# Patient Record
Sex: MALE | Race: BLACK OR AFRICAN AMERICAN | Employment: OTHER | URBAN - METROPOLITAN AREA
[De-identification: names, ages, dates, MRNs, and addresses within clinical notes are randomized per-mention and may not be internally consistent; named-entity substitution may affect disease eponyms.]

---

## 2021-01-02 ENCOUNTER — APPOINTMENT (OUTPATIENT)
Dept: CT IMAGING | Age: 61
DRG: 871 | End: 2021-01-02
Payer: MEDICARE

## 2021-01-02 ENCOUNTER — APPOINTMENT (OUTPATIENT)
Dept: GENERAL RADIOLOGY | Age: 61
DRG: 871 | End: 2021-01-02
Payer: MEDICARE

## 2021-01-02 ENCOUNTER — HOSPITAL ENCOUNTER (INPATIENT)
Age: 61
LOS: 1 days | Discharge: NURSING, REHAB OR LONG-TERM CARE | DRG: 871 | End: 2021-01-04
Attending: EMERGENCY MEDICINE | Admitting: INTERNAL MEDICINE
Payer: MEDICARE

## 2021-01-02 DIAGNOSIS — R94.31 T WAVE INVERSION IN EKG: ICD-10-CM
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
## 2021-01-02 LAB
A/G RATIO: 1.7 (ref 1.1–2.2)
ALBUMIN SERPL-MCNC: 4.3 G/DL (ref 3.4–5)
ALP BLD-CCNC: 48 U/L (ref 40–129)
ALT SERPL-CCNC: 22 U/L (ref 10–40)
ANION GAP SERPL CALCULATED.3IONS-SCNC: 12 MMOL/L (ref 3–16)
AST SERPL-CCNC: 26 U/L (ref 15–37)
BACTERIA: ABNORMAL /HPF
BASE EXCESS VENOUS: -0.8 MMOL/L (ref -2–3)
BASOPHILS ABSOLUTE: 0 K/UL (ref 0–0.2)
BASOPHILS RELATIVE PERCENT: 0.1 %
BILIRUB SERPL-MCNC: 0.3 MG/DL (ref 0–1)
BILIRUBIN URINE: NEGATIVE
BLOOD, URINE: ABNORMAL
BUN BLDV-MCNC: 14 MG/DL (ref 7–20)
CALCIUM SERPL-MCNC: 9.3 MG/DL (ref 8.3–10.6)
CARBOXYHEMOGLOBIN: 1.5 % (ref 0–1.5)
CHLORIDE BLD-SCNC: 106 MMOL/L (ref 99–110)
CHP ED QC CHECK: YES
CLARITY: CLEAR
CO2: 23 MMOL/L (ref 21–32)
COLOR: YELLOW
CREAT SERPL-MCNC: 1 MG/DL (ref 0.9–1.3)
EKG ATRIAL RATE: 123 BPM
EKG DIAGNOSIS: NORMAL
EKG P AXIS: 49 DEGREES
EKG P-R INTERVAL: 148 MS
EKG Q-T INTERVAL: 312 MS
EKG QRS DURATION: 82 MS
EKG QTC CALCULATION (BAZETT): 446 MS
EKG R AXIS: 72 DEGREES
EKG T AXIS: -1 DEGREES
EKG VENTRICULAR RATE: 123 BPM
EOSINOPHILS ABSOLUTE: 0 K/UL (ref 0–0.6)
EOSINOPHILS RELATIVE PERCENT: 0.1 %
EPITHELIAL CELLS, UA: ABNORMAL /HPF (ref 0–5)
GFR AFRICAN AMERICAN: >60
GFR NON-AFRICAN AMERICAN: >60
GLOBULIN: 2.5 G/DL
GLUCOSE BLD-MCNC: 106 MG/DL
GLUCOSE BLD-MCNC: 106 MG/DL (ref 70–99)
GLUCOSE BLD-MCNC: 110 MG/DL (ref 70–99)
GLUCOSE URINE: NEGATIVE MG/DL
HCO3 VENOUS: 23 MMOL/L (ref 24–28)
HCT VFR BLD CALC: 42 % (ref 40.5–52.5)
HEMOGLOBIN, VEN, REDUCED: 26.9 %
HEMOGLOBIN: 14 G/DL (ref 13.5–17.5)
KETONES, URINE: 15 MG/DL
LACTIC ACID: 2 MMOL/L (ref 0.4–2)
LACTIC ACID: 2.9 MMOL/L (ref 0.4–2)
LEUKOCYTE ESTERASE, URINE: NEGATIVE
LIPASE: 56 U/L (ref 13–60)
LYMPHOCYTES ABSOLUTE: 0.4 K/UL (ref 1–5.1)
LYMPHOCYTES RELATIVE PERCENT: 6.6 %
MCH RBC QN AUTO: 29.6 PG (ref 26–34)
MCHC RBC AUTO-ENTMCNC: 33.3 G/DL (ref 31–36)
MCV RBC AUTO: 89 FL (ref 80–100)
METHEMOGLOBIN VENOUS: 0.5 % (ref 0–1.5)
MICROSCOPIC EXAMINATION: YES
MONOCYTES ABSOLUTE: 0.4 K/UL (ref 0–1.3)
MONOCYTES RELATIVE PERCENT: 6.4 %
NEUTROPHILS ABSOLUTE: 5.9 K/UL (ref 1.7–7.7)
NEUTROPHILS RELATIVE PERCENT: 86.8 %
NITRITE, URINE: NEGATIVE
O2 SAT, VEN: 73 %
PCO2, VEN: 37.6 MMHG (ref 41–51)
PDW BLD-RTO: 15.1 % (ref 12.4–15.4)
PERFORMED ON: ABNORMAL
PH UA: 6.5 (ref 5–8)
PH VENOUS: 7.41 (ref 7.35–7.45)
PLATELET # BLD: 111 K/UL (ref 135–450)
PMV BLD AUTO: 10.3 FL (ref 5–10.5)
PO2, VEN: 40.6 MMHG (ref 25–40)
POTASSIUM REFLEX MAGNESIUM: 3.6 MMOL/L (ref 3.5–5.1)
PRO-BNP: 38 PG/ML (ref 0–124)
PROCALCITONIN: 1.08 NG/ML (ref 0–0.15)
PROTEIN UA: ABNORMAL MG/DL
RBC # BLD: 4.72 M/UL (ref 4.2–5.9)
RBC UA: ABNORMAL /HPF (ref 0–4)
SODIUM BLD-SCNC: 141 MMOL/L (ref 136–145)
SPECIFIC GRAVITY UA: 1.01 (ref 1–1.03)
TCO2 CALC VENOUS: 24 MMOL/L
TOTAL PROTEIN: 6.8 G/DL (ref 6.4–8.2)
TROPONIN: 0.02 NG/ML
TROPONIN: <0.01 NG/ML
TROPONIN: <0.01 NG/ML
URINE REFLEX TO CULTURE: ABNORMAL
URINE TYPE: ABNORMAL
UROBILINOGEN, URINE: 2 E.U./DL
VALPROIC ACID LEVEL: 66.6 UG/ML (ref 50–100)
WBC # BLD: 6.8 K/UL (ref 4–11)
WBC UA: ABNORMAL /HPF (ref 0–5)

## 2021-01-02 PROCEDURE — 6360000002 HC RX W HCPCS: Performed by: INTERNAL MEDICINE

## 2021-01-02 PROCEDURE — 96361 HYDRATE IV INFUSION ADD-ON: CPT

## 2021-01-02 PROCEDURE — 6370000000 HC RX 637 (ALT 250 FOR IP): Performed by: INTERNAL MEDICINE

## 2021-01-02 PROCEDURE — 71045 X-RAY EXAM CHEST 1 VIEW: CPT

## 2021-01-02 PROCEDURE — 73080 X-RAY EXAM OF ELBOW: CPT

## 2021-01-02 PROCEDURE — 2580000003 HC RX 258: Performed by: INTERNAL MEDICINE

## 2021-01-02 PROCEDURE — 73521 X-RAY EXAM HIPS BI 2 VIEWS: CPT

## 2021-01-02 PROCEDURE — 83880 ASSAY OF NATRIURETIC PEPTIDE: CPT

## 2021-01-02 PROCEDURE — 85025 COMPLETE CBC W/AUTO DIFF WBC: CPT

## 2021-01-02 PROCEDURE — G0378 HOSPITAL OBSERVATION PER HR: HCPCS

## 2021-01-02 PROCEDURE — 82803 BLOOD GASES ANY COMBINATION: CPT

## 2021-01-02 PROCEDURE — 80053 COMPREHEN METABOLIC PANEL: CPT

## 2021-01-02 PROCEDURE — 74177 CT ABD & PELVIS W/CONTRAST: CPT

## 2021-01-02 PROCEDURE — 80164 ASSAY DIPROPYLACETIC ACD TOT: CPT

## 2021-01-02 PROCEDURE — 84145 PROCALCITONIN (PCT): CPT

## 2021-01-02 PROCEDURE — U0003 INFECTIOUS AGENT DETECTION BY NUCLEIC ACID (DNA OR RNA); SEVERE ACUTE RESPIRATORY SYNDROME CORONAVIRUS 2 (SARS-COV-2) (CORONAVIRUS DISEASE [COVID-19]), AMPLIFIED PROBE TECHNIQUE, MAKING USE OF HIGH THROUGHPUT TECHNOLOGIES AS DESCRIBED BY CMS-2020-01-R: HCPCS

## 2021-01-02 PROCEDURE — 87449 NOS EACH ORGANISM AG IA: CPT

## 2021-01-02 PROCEDURE — 84484 ASSAY OF TROPONIN QUANT: CPT

## 2021-01-02 PROCEDURE — 73560 X-RAY EXAM OF KNEE 1 OR 2: CPT

## 2021-01-02 PROCEDURE — 81001 URINALYSIS AUTO W/SCOPE: CPT

## 2021-01-02 PROCEDURE — 6370000000 HC RX 637 (ALT 250 FOR IP): Performed by: STUDENT IN AN ORGANIZED HEALTH CARE EDUCATION/TRAINING PROGRAM

## 2021-01-02 PROCEDURE — 6360000004 HC RX CONTRAST MEDICATION: Performed by: STUDENT IN AN ORGANIZED HEALTH CARE EDUCATION/TRAINING PROGRAM

## 2021-01-02 PROCEDURE — 2580000003 HC RX 258: Performed by: STUDENT IN AN ORGANIZED HEALTH CARE EDUCATION/TRAINING PROGRAM

## 2021-01-02 PROCEDURE — 36415 COLL VENOUS BLD VENIPUNCTURE: CPT

## 2021-01-02 PROCEDURE — 72125 CT NECK SPINE W/O DYE: CPT

## 2021-01-02 PROCEDURE — 83690 ASSAY OF LIPASE: CPT

## 2021-01-02 PROCEDURE — 93005 ELECTROCARDIOGRAM TRACING: CPT | Performed by: STUDENT IN AN ORGANIZED HEALTH CARE EDUCATION/TRAINING PROGRAM

## 2021-01-02 PROCEDURE — 96360 HYDRATION IV INFUSION INIT: CPT

## 2021-01-02 PROCEDURE — 83605 ASSAY OF LACTIC ACID: CPT

## 2021-01-02 PROCEDURE — 70450 CT HEAD/BRAIN W/O DYE: CPT

## 2021-01-02 PROCEDURE — 99284 EMERGENCY DEPT VISIT MOD MDM: CPT

## 2021-01-02 RX ORDER — ONDANSETRON 2 MG/ML
4 INJECTION INTRAMUSCULAR; INTRAVENOUS EVERY 6 HOURS PRN
Status: DISCONTINUED | OUTPATIENT
Start: 2021-01-02 | End: 2021-01-05 | Stop reason: HOSPADM

## 2021-01-02 RX ORDER — ATORVASTATIN CALCIUM 20 MG/1
20 TABLET, FILM COATED ORAL DAILY
COMMUNITY
Start: 2020-06-11

## 2021-01-02 RX ORDER — BACLOFEN 5 MG/1
5 TABLET ORAL 2 TIMES DAILY PRN
COMMUNITY
Start: 2020-05-31

## 2021-01-02 RX ORDER — SODIUM CHLORIDE, SODIUM LACTATE, POTASSIUM CHLORIDE, CALCIUM CHLORIDE 600; 310; 30; 20 MG/100ML; MG/100ML; MG/100ML; MG/100ML
1000 INJECTION, SOLUTION INTRAVENOUS ONCE
Status: COMPLETED | OUTPATIENT
Start: 2021-01-02 | End: 2021-01-02

## 2021-01-02 RX ORDER — LANOLIN ALCOHOL/MO/W.PET/CERES
1000 CREAM (GRAM) TOPICAL DAILY
COMMUNITY

## 2021-01-02 RX ORDER — ERGOCALCIFEROL 1.25 MG/1
50000 CAPSULE ORAL WEEKLY
COMMUNITY

## 2021-01-02 RX ORDER — ACETAMINOPHEN 325 MG/1
650 TABLET ORAL EVERY 6 HOURS PRN
Status: DISCONTINUED | OUTPATIENT
Start: 2021-01-02 | End: 2021-01-05 | Stop reason: HOSPADM

## 2021-01-02 RX ORDER — ACETAMINOPHEN 325 MG/1
650 TABLET ORAL ONCE
Status: COMPLETED | OUTPATIENT
Start: 2021-01-02 | End: 2021-01-02

## 2021-01-02 RX ORDER — CLOZAPINE 100 MG/1
TABLET ORAL
COMMUNITY
Start: 2020-06-21 | End: 2021-01-02 | Stop reason: CLARIF

## 2021-01-02 RX ORDER — POLYETHYLENE GLYCOL 3350 17 G/17G
17 POWDER, FOR SOLUTION ORAL DAILY PRN
Status: DISCONTINUED | OUTPATIENT
Start: 2021-01-02 | End: 2021-01-05 | Stop reason: HOSPADM

## 2021-01-02 RX ORDER — CLOZAPINE 100 MG/1
100 TABLET ORAL DAILY
Status: DISCONTINUED | OUTPATIENT
Start: 2021-01-03 | End: 2021-01-05 | Stop reason: HOSPADM

## 2021-01-02 RX ORDER — CLOZAPINE 100 MG/1
100 TABLET ORAL DAILY
COMMUNITY

## 2021-01-02 RX ORDER — M-VIT,TX,IRON,MINS/CALC/FOLIC 27MG-0.4MG
1 TABLET ORAL DAILY
COMMUNITY

## 2021-01-02 RX ORDER — DIVALPROEX SODIUM 500 MG/1
500 TABLET, DELAYED RELEASE ORAL 2 TIMES DAILY
COMMUNITY
Start: 2020-06-22

## 2021-01-02 RX ORDER — PROMETHAZINE HYDROCHLORIDE 25 MG/1
12.5 TABLET ORAL EVERY 6 HOURS PRN
Status: DISCONTINUED | OUTPATIENT
Start: 2021-01-02 | End: 2021-01-05 | Stop reason: HOSPADM

## 2021-01-02 RX ORDER — AZITHROMYCIN 250 MG/1
500 TABLET, FILM COATED ORAL ONCE
Status: COMPLETED | OUTPATIENT
Start: 2021-01-02 | End: 2021-01-02

## 2021-01-02 RX ORDER — HALOPERIDOL 5 MG
5 TABLET ORAL NIGHTLY
Status: DISCONTINUED | OUTPATIENT
Start: 2021-01-02 | End: 2021-01-02 | Stop reason: CLARIF

## 2021-01-02 RX ORDER — ACETAMINOPHEN 500 MG
1000 TABLET ORAL EVERY 8 HOURS PRN
COMMUNITY

## 2021-01-02 RX ORDER — CLOZAPINE 100 MG/1
200 TABLET ORAL NIGHTLY
Status: DISCONTINUED | OUTPATIENT
Start: 2021-01-02 | End: 2021-01-05 | Stop reason: HOSPADM

## 2021-01-02 RX ORDER — CHLORAL HYDRATE 500 MG
1000 CAPSULE ORAL 2 TIMES DAILY
COMMUNITY

## 2021-01-02 RX ORDER — CLOZAPINE 100 MG/1
200 TABLET ORAL NIGHTLY
COMMUNITY

## 2021-01-02 RX ORDER — FAMOTIDINE 20 MG/1
20 TABLET, FILM COATED ORAL DAILY
COMMUNITY
Start: 2020-05-31

## 2021-01-02 RX ORDER — LANOLIN ALCOHOL/MO/W.PET/CERES
3 CREAM (GRAM) TOPICAL NIGHTLY
COMMUNITY

## 2021-01-02 RX ORDER — SODIUM CHLORIDE 0.9 % (FLUSH) 0.9 %
10 SYRINGE (ML) INJECTION EVERY 12 HOURS SCHEDULED
Status: DISCONTINUED | OUTPATIENT
Start: 2021-01-02 | End: 2021-01-05 | Stop reason: HOSPADM

## 2021-01-02 RX ORDER — IBUPROFEN 800 MG/1
800 TABLET ORAL EVERY 8 HOURS PRN
COMMUNITY

## 2021-01-02 RX ORDER — SERTRALINE HYDROCHLORIDE 100 MG/1
150 TABLET, FILM COATED ORAL DAILY
COMMUNITY
Start: 2020-06-17

## 2021-01-02 RX ORDER — BACLOFEN 5 MG/1
5 TABLET ORAL NIGHTLY
COMMUNITY

## 2021-01-02 RX ORDER — SODIUM CHLORIDE 0.9 % (FLUSH) 0.9 %
10 SYRINGE (ML) INJECTION PRN
Status: DISCONTINUED | OUTPATIENT
Start: 2021-01-02 | End: 2021-01-05 | Stop reason: HOSPADM

## 2021-01-02 RX ORDER — ACETAMINOPHEN 650 MG/1
650 SUPPOSITORY RECTAL EVERY 6 HOURS PRN
Status: DISCONTINUED | OUTPATIENT
Start: 2021-01-02 | End: 2021-01-05 | Stop reason: HOSPADM

## 2021-01-02 RX ORDER — FLUOXETINE 20 MG/1
20 TABLET, FILM COATED ORAL DAILY
Status: DISCONTINUED | OUTPATIENT
Start: 2021-01-02 | End: 2021-01-02 | Stop reason: CLARIF

## 2021-01-02 RX ORDER — PROPRANOLOL HYDROCHLORIDE 20 MG/1
20 TABLET ORAL 2 TIMES DAILY
Status: DISCONTINUED | OUTPATIENT
Start: 2021-01-02 | End: 2021-01-02 | Stop reason: CLARIF

## 2021-01-02 RX ADMIN — DEXTROSE MONOHYDRATE 1 G: 5 INJECTION INTRAVENOUS at 19:33

## 2021-01-02 RX ADMIN — SODIUM CHLORIDE, SODIUM LACTATE, POTASSIUM CHLORIDE, AND CALCIUM CHLORIDE 1000 ML: .6; .31; .03; .02 INJECTION, SOLUTION INTRAVENOUS at 13:31

## 2021-01-02 RX ADMIN — AZITHROMYCIN 500 MG: 250 TABLET, FILM COATED ORAL at 20:04

## 2021-01-02 RX ADMIN — ACETAMINOPHEN 650 MG: 325 TABLET ORAL at 13:31

## 2021-01-02 RX ADMIN — IOPAMIDOL 80 ML: 755 INJECTION, SOLUTION INTRAVENOUS at 14:04

## 2021-01-02 RX ADMIN — CLOZAPINE 200 MG: 100 TABLET ORAL at 21:59

## 2021-01-02 ASSESSMENT — PAIN DESCRIPTION - LOCATION: LOCATION: ELBOW

## 2021-01-02 ASSESSMENT — PAIN DESCRIPTION - FREQUENCY: FREQUENCY: CONTINUOUS

## 2021-01-02 ASSESSMENT — PAIN - FUNCTIONAL ASSESSMENT: PAIN_FUNCTIONAL_ASSESSMENT: ACTIVITIES ARE NOT PREVENTED

## 2021-01-02 ASSESSMENT — PAIN SCALES - GENERAL: PAINLEVEL_OUTOF10: 10

## 2021-01-02 ASSESSMENT — PAIN DESCRIPTION - PAIN TYPE: TYPE: ACUTE PAIN

## 2021-01-02 ASSESSMENT — PAIN DESCRIPTION - DESCRIPTORS: DESCRIPTORS: ACHING

## 2021-01-02 ASSESSMENT — PAIN DESCRIPTION - PROGRESSION: CLINICAL_PROGRESSION: NOT CHANGED

## 2021-01-02 NOTE — ED PROVIDER NOTES
department consisted of labs, EKG, chest x-ray, urinalysis, COVID-19 swab, Tylenol, and IV fluid. His urinalysis is pending as well as COVID-19 swab. Lactate is elevated at 2.9, patient was given IV fluid. BNP reassuring at 38. Slight bump in troponin that will be repeated. No leukocytosis or left shift. There is a thrombocytopenia that can be seen as an acute phase reactant. Given his persistent tachycardia, and fever with moderately elevated lactate with concerns for severe sepsis, anticipate the patient will need to be admitted.     Please see resident note for reassessments and final disposition         Jeremy Banegas MD  01/02/21 0706

## 2021-01-02 NOTE — H&P
Hospital Medicine History & Physical      PCP: Rangel Aguilar MD    Date of Admission: 1/2/2021    Date of Service: Pt seen/examined on 1/2/2021 and  Placed in Observation. Chief Complaint:  fall      History Of Present Illness:  Patient is 56-year-old gentleman resident of [facility] with history of schizophrenia, depression presented to ER after he had a fall. Patient reported that on baseline he uses walker and wheelchair while today he was trying to get out of the bathroom when he tried to sit up on the bed from wheelchair he slid down and had a fall on the right side. Denies any lightheadedness, dizziness prior to fall or loss of consciousness. Patient denies any chest pain, shortness of breath, nausea, vomiting, fever, chills. He is complaining of pain under his right elbow. On arrival to ER patient was noted to have fever 101.7. Blood work significant for lactic acid of 2.9, initial troponin 0 0.02. Imaging including CT head, cervical spine  CT abdomen pelvis minimal nonspecific groundglass opacities and consolidation in the lingula and left lower lobe. EKG showed sinus tachycardia, sinus rhythm with T wave inversion in lead II, III, aVF and precordial leads. Precordial lead T wave inversions are new compared to EKG with UC last week. Patient is being admitted for new EKG changes. Past Medical History:          Diagnosis Date    B[facility] 1 disorder ([facility]     Diabetes mellitus ([facility] [facility]     Dizziness     Schizo-affective psychosis ([facility] [address]        Past Surgical History:          Procedure Laterality Date    COLONOSCOPY         Medications Prior to Admission:      Prior to Admission medications    Medication Sig Start Date End Date Taking? Authorizing Provider   haloperidol (HALDOL) 0.5 MG tablet Take 5 mg by mouth nightly. Historical Provider, MD   cloZAPine (CLOZARIL) 100 MG tablet Take 100 mg by mouth 2 times daily.     Historical Provider, MD   lithium 300 MG capsule Take 300 mg by mouth 2 times daily (with meals). Historical Provider, MD   propranolol (INDERAL) 20 MG tablet Take 20 mg by mouth 2 times daily. Historical Provider, MD   FLUoxetine (PROZAC) 20 MG tablet Take 20 mg by mouth daily. Historical Provider, MD   HYDROcodone-acetaminophen (NORCO) 5-325 MG per tablet Take 1-2 tablets by mouth every 6 hours as needed for Pain. WARNING:  May cause drowsiness. May impair ability to operate vehicles or machinery. Do not use in combination with alcohol. 7/28/14   Nahid Cornelius MD       Allergies: Other    Social History:      The patient currently lives assisted living [facility]    TOBACCO:   reports that he has never smoked. He does not have any smokeless tobacco history on file. ETOH:   reports no history of alcohol use. Family History:       Reviewed in detail and negative for DM, CAD, Cancer, CVA. Problem Relation Age of Onset    High Blood Pressure Mother     Cancer Maternal Aunt     Diabetes Maternal Aunt     Asthma Maternal Cousin     Stroke Other         MGA       REVIEW OF SYSTEMS:   Pertinent positives as noted in the HPI. All other systems reviewed and negative. PHYSICAL EXAM PERFORMED:    /74   Pulse 128   Temp 101.7 °F (38.7 °C) (Oral)   Resp 18   Ht 5' 11\" (1.803 m)   Wt 225 lb (102.1 kg)   SpO2 97%   BMI 31.38 kg/m²     General appearance:  No apparent distress, appears stated age and cooperative. HEENT:  Normal cephalic, atraumatic without obvious deformity. Pupils equal, round, and reactive to light. Extra ocular muscles intact. Conjunctivae/corneas clear. Neck: Supple, with full range of motion. No jugular venous distention. Trachea midline. Respiratory:  Normal respiratory effort. Clear to auscultation, bilaterally without Rales/Wheezes/Rhonchi. Cardiovascular:  Regular rate and rhythm with normal S1/S2 without murmurs, rubs or gallops.   Abdomen: Soft, non-tender, non-distended with normal bowel sounds. Musculoskeletal:  No clubbing, cyanosis or edema bilaterally. Full range of motion without deformity. Skin: Skin color, texture, turgor normal.  No rashes or lesions. Neurologic:  Neurovascularly intact without any focal sensory/motor deficits. Cranial nerves: II-XII intact, grossly non-focal.  Psychiatric:  Alert and oriented, thought content appropriate, normal insight  Capillary Refill: Brisk,< 3 seconds   Peripheral Pulses: +2 palpable, equal bilaterally       Labs:     Recent Labs     01/02/21  1239   WBC 6.8   HGB 14.0   HCT 42.0   *     Recent Labs     01/02/21  1239      K 3.6      CO2 23   BUN 14   CREATININE 1.0   CALCIUM 9.3     Recent Labs     01/02/21  1239   AST 26   ALT 22   BILITOT 0.3   ALKPHOS 48     No results for input(s): INR in the last 72 hours. Recent Labs     01/02/21  1239 01/02/21  1411   TROPONINI 0.02* <0.01       Urinalysis:      Lab Results   Component Value Date    NITRU Negative 01/02/2021    WBCUA 3-5 01/02/2021    BACTERIA Rare 01/02/2021    RBCUA 5-10 01/02/2021    BLOODU TRACE-INTACT 01/02/2021    SPECGRAV 1.015 01/02/2021    GLUCOSEU Negative 01/02/2021       Radiology:     CXR: I have reviewed the CXR with the following interpretation: see radio report  EKG:  I have reviewed the EKG with the following interpretation: sinus tachycardia, TWI in precordial leads. Reviewed radiology reports    ASSESSMENT:    Active Hospital Problems    Diagnosis Date Noted    Nonspecific ST-T wave electrocardiographic changes [R94.31] 01/02/2021         Assessment and plan  #EKG changes  Troponin 0 0.02> 0.01  We will get another set of troponin. Repeat EKG in morning. No prior history of cardiac disease. We will have cardiology evaluation. Will defer further cardiac work-up to them. #Mechanical fall  #Right elbow pain   Ice pack  PT/OT has been consulted. #Fever  #Lactic acidosis. Rule out COVID-19. Patient is not hypoxic  We will monitor. Check procalcitonin level. UA did not showed WBC. #schizophrenia stable  Resume home medication. DVT Prophylaxis: lovenox  Diet: No diet orders on file Cardiac diet  Code Status: Prior Full code    PT/OT Eval Status: consulted. Dispo - obs. Cardio eval.     This chart was likely completed using voice recognition technology and may contain unintended grammatical , phraseology,and/or punctuation errors       Drea Escoto MD    Thank you Trav Bernal MD for the opportunity to be involved in this patient's care. If you have any questions or concerns please feel free to contact me at [phone].

## 2021-01-02 NOTE — ED PROVIDER NOTES
[id number] RESIDENT NOTE       Date of evaluation: 1/2/2021    Chief Complaint     No chief complaint on file. History of Present Illness     Tila Olivares is a 64 y.o. male who presents for fall at care facility. Patient reports that he is try to get back into bed this morning, within the last 2 hours leading up to presentation in the ED, and slipped try to get back into bed as he could not get over further enough into the bed. He subsequently slipped onto the floor, and believes that he might of hit his head and neck on the side of the bed on the way down. He did not lose consciousness, does not take any anticoagulation, and has no numbness, tingling, weakness, headache, vision changes, blurry vision at this point time. He is primarily having pain in his C-spine neck, right elbow, right knee, right hip. Of note, he has also had decreased p.o. intake, malaise, and some dyspnea but no productive cough. He has been tested multiple times for COVID-19 with negative results, most recent of which were [facility] on 12/29. Review of Systems     Review of Systems negative except as document above in the HPI. Past Medical, Surgical, Family, and Social History     He has a past medical history of Bipolar 1 disorder ([address] Diabetes mellitus ([address] Dizziness, and Schizo-affective psychosis ([facility] [address] He has a past surgical history that includes Colonoscopy. His family history includes Asthma in his maternal cousin; Cancer in his maternal aunt; Diabetes in his maternal aunt; High Blood Pressure in his mother; Stroke in an other family member. He reports that he has never smoked. He does not have any smokeless tobacco history on file. He reports that he does not drink alcohol or use drugs.     Medications     Previous Medications    ACETAMINOPHEN (TYLENOL) 500 MG TABLET    Take 1,000 mg by mouth every 8 hours as needed for Pain    ATORVASTATIN (LIPITOR) 20 MG TABLET    Take 20 mg by mouth daily     BACLOFEN (LIORESAL) 5 MG TABLET    Take 5 mg by mouth 2 times daily as needed (muscle spasms)     BACLOFEN (LIORESAL) 5 MG TABLET    Take 5 mg by mouth nightly    CLOZAPINE (CLOZARIL) 100 MG TABLET    Take 100 mg by mouth daily    CLOZAPINE (CLOZARIL) 100 MG TABLET    Take 200 mg by mouth nightly    DIVALPROEX (DEPAKOTE) 500 MG DR TABLET    Take 500 mg by mouth 2 times daily     FAMOTIDINE (PEPCID) 20 MG TABLET    Take 20 mg by mouth daily     IBUPROFEN (ADVIL;MOTRIN) 800 MG TABLET    Take 800 mg by mouth every 8 hours as needed for Pain    MAGNESIUM HYDROXIDE (MILK OF MAGNESIA) 400 MG/5ML SUSPENSION    Take 30 mLs by mouth daily as needed for Constipation    MELATONIN 3 MG TABS TABLET    Take 3 mg by mouth nightly    MULTIPLE VITAMINS-MINERALS (THERAPEUTIC MULTIVITAMIN-MINERALS) TABLET    Take 1 tablet by mouth daily    OMEGA-3 FATTY ACIDS (FISH OIL) 1000 MG CAPS    Take 1,000 mg by mouth 2 times daily    SERTRALINE (ZOLOFT) 100 MG TABLET    Take 150 mg by mouth daily     VITAMIN B-12 (CYANOCOBALAMIN) 1000 MCG TABLET    Take 1,000 mcg by mouth daily    VITAMIN D (ERGOCALCIFEROL) 1.25 MG (62785 UT) CAPS CAPSULE    Take 50,000 Units by mouth once a week Saturdays       Allergies     He is allergic to other. Physical Exam     INITIAL VITALS: BP: 110/74, Temp: 101.7 °F (38.7 °C), Pulse: 128, Resp: 18, SpO2: 97 %   Physical Exam  Vitals signs and nursing note reviewed. Constitutional:       General: He is not in acute distress. Appearance: He is not diaphoretic. HENT:      Head: Normocephalic and atraumatic. Comments: No external signs of trauma to the occiput, although does have tenderness to palpation of the midline C-spine. No obvious ecchymoses or lacerations     Mouth/Throat:      Mouth: Mucous membranes are moist.      Pharynx: Oropharynx is clear. Eyes:      General: No scleral icterus. Right eye: No discharge. Left eye: No discharge. [facility], T wave inversions are new new in precordial leads, has new inversions in some of his inferior leads as well  Comparison: 12/29 at [address]:  CT ABDOMEN PELVIS W IV CONTRAST Additional Contrast? Radiologist Recommendation   Final Result      No acute abdominopelvic abnormality. Minimal nonspecific patchy ground glass opacities and consolidation in the lingula and left lower lobe. This may be infectious or inflammatory to include atypical/viral pneumonia related to atelectasis. CT Cervical Spine WO Contrast   Final Result      No acute bony pathology visualized. Significant degenerative changes. Evaluation of central canal stenosis or neural foraminal narrowing can be accomplished with MRI on an outpatient basis. CT Head WO Contrast   Final Result      No acute intracranial pathology        Chronic maxillary sinusitis         XR HIP BILATERAL W AP PELVIS (2 VIEWS)   Final Result   Addendum 1 of 1   [** ** ** ** ADDENDUM #1 ** ** ** **   There is a typographical error in the body report. It should state \"mild    bilateral hip OSTEOARTHROSIS\". Final      XR KNEE RIGHT (1-2 VIEWS)   Final Result      No acute bony pathology.       Mild degenerative change      XR ELBOW RIGHT (MIN 3 VIEWS)   Final Result      No acute bony pathology      XR CHEST PORTABLE   Final Result      No acute pulmonary pathology or change from the prior study                      LABS:   Results for orders placed or performed during the hospital encounter of 01/02/21   CBC Auto Differential   Result Value Ref Range    WBC 6.8 4.0 - 11.0 K/uL    RBC 4.72 4.20 - 5.90 M/uL    Hemoglobin 14.0 13.5 - 17.5 g/dL    Hematocrit 42.0 40.5 - 52.5 %    MCV 89.0 80.0 - 100.0 fL    MCH 29.6 26.0 - 34.0 pg    MCHC 33.3 31.0 - 36.0 g/dL    RDW 15.1 12.4 - 15.4 %    Platelets 777 (L) 419 - 450 K/uL    MPV 10.3 5.0 - 10.5 fL    Neutrophils % 86.8 %    Lymphocytes % Lactic Acid 2.9 (H) 0.4 - 2.0 mmol/L   Blood gas, venous (Lab)   Result Value Ref Range    pH, Mandeep 7.405 7.350 - 7.450    pCO2, Mandeep 37.6 (L) 41.0 - 51.0 mmHg    pO2, Mandeep 40.6 (H) 25.0 - 40.0 mmHg    HCO3, Venous 23.0 (L) 24.0 - 28.0 mmol/L    Base Excess, Mandeep -0.8 -2.0 - 3.0 mmol/L    O2 Sat, Mandeep 73 Not established %    Carboxyhemoglobin 1.5 0.0 - 1.5 %    MetHgb, Mandeep 0.5 0.0 - 1.5 %    TC02 (Calc), Mandeep 24 mmol/L    Hemoglobin, Mandeep, Reduced 26.90 %   Troponin   Result Value Ref Range    Troponin <0.01 <0.01 ng/mL   Lactate, plasma   Result Value Ref Range    Lactic Acid 2.0 0.4 - 2.0 mmol/L   Microscopic Urinalysis   Result Value Ref Range    WBC, UA 3-5 0 - 5 /HPF    RBC, UA 5-10 (A) 0 - 4 /HPF    Epithelial Cells, UA 2-5 0 - 5 /HPF    Bacteria, UA Rare (A) None Seen /HPF   Procalcitonin   Result Value Ref Range    Procalcitonin 1.08 (H) 0.00 - 0.15 ng/mL   Troponin   Result Value Ref Range    Troponin <0.01 <0.01 ng/mL   POC Glucose   Result Value Ref Range    Glucose 106 mg/dL    QC OK? yes    POCT Glucose   Result Value Ref Range    POC Glucose 106 (H) 70 - 99 mg/dl    Performed on ACCU-CHEK    EKG 12 Lead   Result Value Ref Range    Ventricular Rate 123 BPM    Atrial Rate 123 BPM    P-R Interval 148 ms    QRS Duration 82 ms    Q-T Interval 312 ms    QTc Calculation (Bazett) 446 ms    P Axis 49 degrees    R Axis 72 degrees    T Axis -1 degrees    Diagnosis       EKG performed in ER and to be interpreted by ER physician. Confirmed by MD, ER ([id number]),  Kaushik Ponce ([id number]) on 1/2/2021 12:09:01 PM       ED BEDSIDE ULTRASOUND:       RECENT VITALS:  BP: 116/78, Temp: 99.9 °F (37.7 °C), Pulse: 106,Resp: 20, SpO2: 99 %     Procedures          ED Course     Nursing Notes, Past Medical Hx, Past Surgical Hx, Social Hx, Allergies, and Family Hx were reviewed.     The patient was given the followingmedications:  Orders Placed This Encounter   Medications    acetaminophen (TYLENOL) tablet 650 mg    lactated ringers infusion 1,000 mL    iopamidol (ISOVUE-370) 76 % injection 80 mL    cloZAPine (CLOZARIL) tablet 100 mg    DISCONTD: FLUoxetine (PROZAC) tablet 20 mg    DISCONTD: haloperidol (HALDOL) tablet 5 mg    DISCONTD: propranolol (INDERAL) tablet 20 mg    sodium chloride flush 0.9 % injection 10 mL    sodium chloride flush 0.9 % injection 10 mL    enoxaparin (LOVENOX) injection 40 mg    OR Linked Order Group     promethazine (PHENERGAN) tablet 12.5 mg     ondansetron (ZOFRAN) injection 4 mg    polyethylene glycol (GLYCOLAX) packet 17 g    OR Linked Order Group     acetaminophen (TYLENOL) tablet 650 mg     acetaminophen (TYLENOL) suppository 650 mg    cefTRIAXone (ROCEPHIN) 1 g IVPB in 50 mL D5W minibag     Order Specific Question:   Antimicrobial Indications     Answer:   Aspiration Pneumonia    azithromycin (ZITHROMAX) tablet 500 mg     Order Specific Question:   Antimicrobial Indications     Answer:   Aspiration Pneumonia    cloZAPine (CLOZARIL) tablet 200 mg       CONSULTS:  IP CONSULT TO HOSPITALIST  IP CONSULT TO PHARMACY  IP CONSULT TO SOCIAL WORK  IP CONSULT TO [address] [place] [address] / ISAÍAS / Elsy Davy is a 64 y.o. male with past medical history as above who is presenting for fall from bed. Patient's neurological exam is intact and without sign of focal deficit at this point time, although right upper extremity examination is limited secondary to pain status post fall. He did not have any preceding symptoms including cardiac symptom such as palpitations, presyncopal symptoms, lightheadedness, chest pain, shortness of breath; additionally, he denies any bruising neurological signs that he thinks may have caused him to fall, such as dizziness, nausea, vomiting, weakness on one side or the other, sensation changes.   He is at higher risk for intracranial and C-spine injury given his midline tenderness to palpation, thus CT head and neck was performed which is negative for acute intracranial process or C-spine fracture. Additional laboratory work-up was obtained to evaluate for other potential medical etiologies for his fall, and was remarkable for a stable troponin x2, although has new signs and his EKG when compared with recent EKG concerning for progression of ischemia given the fact that he had new inversions. He had no chest pain concerning for unstable angina at this point time. Given the fact the troponin slightly elevated but stable, low suspicion for this being NSTEMI at this point in time, likely demand associated in setting of significant tachycardia on arrival.  He is also febrile; although he has had multiple Covid test that were negative in past, still suspect that he may be reexposed given his living situation, and obtain new Covid test today. Initial infectious work-up was obtained as well. This patient was also evaluated by the attending physician. All care plans werediscussed and agreed upon. X-ray imaging of the painful joints was negative for fracture or dislocation. Given the new T wave inversions, slight elevation in troponin, and necessity of additional work-up and evaluation is appropriate, patient was admitted to medicine service for potential further provoked cardiac testing in the setting of many years since his last cardiac testing, cardiovascular risk factors and findings on work-up today. Patient was provided fluids, and admitted to medicine in stable condition. Clinical Impression     1. Fall from bed, initial encounter    2. T wave inversion in EKG        Disposition     PATIENT REFERRED TO:  No follow-up provider specified.     DISCHARGE MEDICATIONS:  New Prescriptions    No medications on file       DISPOSITION Admitted 01/02/2021 03:16:37 PM       Elaine Gonsalves MD  Resident  01/02/21 [id number]

## 2021-01-03 PROBLEM — R94.31 ABNORMAL EKG: Status: ACTIVE | Noted: 2021-01-03

## 2021-01-03 LAB
ANION GAP SERPL CALCULATED.3IONS-SCNC: 12 MMOL/L (ref 3–16)
BUN BLDV-MCNC: 10 MG/DL (ref 7–20)
C-REACTIVE PROTEIN: 139.9 MG/L (ref 0–5.1)
CALCIUM SERPL-MCNC: 8.6 MG/DL (ref 8.3–10.6)
CHLORIDE BLD-SCNC: 103 MMOL/L (ref 99–110)
CO2: 23 MMOL/L (ref 21–32)
CREAT SERPL-MCNC: 0.8 MG/DL (ref 0.9–1.3)
EKG ATRIAL RATE: 123 BPM
EKG DIAGNOSIS: NORMAL
EKG P AXIS: 49 DEGREES
EKG P-R INTERVAL: 148 MS
EKG Q-T INTERVAL: 312 MS
EKG QRS DURATION: 82 MS
EKG QTC CALCULATION (BAZETT): 446 MS
EKG R AXIS: 72 DEGREES
EKG T AXIS: -1 DEGREES
EKG VENTRICULAR RATE: 123 BPM
FERRITIN: 437.4 NG/ML (ref 30–400)
GFR AFRICAN AMERICAN: >60
GFR NON-AFRICAN AMERICAN: >60
GLUCOSE BLD-MCNC: 99 MG/DL (ref 70–99)
HCT VFR BLD CALC: 40.7 % (ref 40.5–52.5)
HEMOGLOBIN: 13.6 G/DL (ref 13.5–17.5)
L. PNEUMOPHILA SEROGP 1 UR AG: NORMAL
LACTATE DEHYDROGENASE: 278 U/L (ref 100–190)
MAGNESIUM: 1.7 MG/DL (ref 1.8–2.4)
MCH RBC QN AUTO: 29.7 PG (ref 26–34)
MCHC RBC AUTO-ENTMCNC: 33.4 G/DL (ref 31–36)
MCV RBC AUTO: 89.1 FL (ref 80–100)
PDW BLD-RTO: 14.7 % (ref 12.4–15.4)
PLATELET # BLD: 106 K/UL (ref 135–450)
PMV BLD AUTO: 10.6 FL (ref 5–10.5)
POTASSIUM REFLEX MAGNESIUM: 3.3 MMOL/L (ref 3.5–5.1)
RBC # BLD: 4.57 M/UL (ref 4.2–5.9)
SARS-COV-2: DETECTED
SODIUM BLD-SCNC: 138 MMOL/L (ref 136–145)
STREP PNEUMONIAE ANTIGEN, URINE: NORMAL
WBC # BLD: 9.8 K/UL (ref 4–11)

## 2021-01-03 PROCEDURE — 82728 ASSAY OF FERRITIN: CPT

## 2021-01-03 PROCEDURE — 99204 OFFICE O/P NEW MOD 45 MIN: CPT | Performed by: INTERNAL MEDICINE

## 2021-01-03 PROCEDURE — 85027 COMPLETE CBC AUTOMATED: CPT

## 2021-01-03 PROCEDURE — 36415 COLL VENOUS BLD VENIPUNCTURE: CPT

## 2021-01-03 PROCEDURE — 96366 THER/PROPH/DIAG IV INF ADDON: CPT

## 2021-01-03 PROCEDURE — 2580000003 HC RX 258: Performed by: INTERNAL MEDICINE

## 2021-01-03 PROCEDURE — 83615 LACTATE (LD) (LDH) ENZYME: CPT

## 2021-01-03 PROCEDURE — 96367 TX/PROPH/DG ADDL SEQ IV INF: CPT

## 2021-01-03 PROCEDURE — 6360000002 HC RX W HCPCS: Performed by: INTERNAL MEDICINE

## 2021-01-03 PROCEDURE — 96372 THER/PROPH/DIAG INJ SC/IM: CPT

## 2021-01-03 PROCEDURE — 6370000000 HC RX 637 (ALT 250 FOR IP): Performed by: INTERNAL MEDICINE

## 2021-01-03 PROCEDURE — 80048 BASIC METABOLIC PNL TOTAL CA: CPT

## 2021-01-03 PROCEDURE — 96365 THER/PROPH/DIAG IV INF INIT: CPT

## 2021-01-03 PROCEDURE — 86140 C-REACTIVE PROTEIN: CPT

## 2021-01-03 PROCEDURE — 83735 ASSAY OF MAGNESIUM: CPT

## 2021-01-03 PROCEDURE — G0378 HOSPITAL OBSERVATION PER HR: HCPCS

## 2021-01-03 RX ORDER — MAGNESIUM SULFATE IN WATER 40 MG/ML
2 INJECTION, SOLUTION INTRAVENOUS ONCE
Status: COMPLETED | OUTPATIENT
Start: 2021-01-03 | End: 2021-01-03

## 2021-01-03 RX ORDER — OXYCODONE HYDROCHLORIDE 5 MG/1
5 TABLET ORAL ONCE
Status: COMPLETED | OUTPATIENT
Start: 2021-01-03 | End: 2021-01-03

## 2021-01-03 RX ORDER — BACLOFEN 10 MG/1
5 TABLET ORAL NIGHTLY
Status: DISCONTINUED | OUTPATIENT
Start: 2021-01-03 | End: 2021-01-05 | Stop reason: HOSPADM

## 2021-01-03 RX ORDER — ATORVASTATIN CALCIUM 20 MG/1
20 TABLET, FILM COATED ORAL DAILY
Status: DISCONTINUED | OUTPATIENT
Start: 2021-01-03 | End: 2021-01-05 | Stop reason: HOSPADM

## 2021-01-03 RX ORDER — DIVALPROEX SODIUM 500 MG/1
500 TABLET, DELAYED RELEASE ORAL 2 TIMES DAILY
Status: DISCONTINUED | OUTPATIENT
Start: 2021-01-03 | End: 2021-01-05 | Stop reason: HOSPADM

## 2021-01-03 RX ORDER — AZITHROMYCIN 250 MG/1
250 TABLET, FILM COATED ORAL DAILY
Status: DISCONTINUED | OUTPATIENT
Start: 2021-01-03 | End: 2021-01-03

## 2021-01-03 RX ORDER — ASPIRIN 81 MG/1
81 TABLET, CHEWABLE ORAL DAILY
Status: DISCONTINUED | OUTPATIENT
Start: 2021-01-03 | End: 2021-01-05 | Stop reason: HOSPADM

## 2021-01-03 RX ORDER — BACLOFEN 10 MG/1
5 TABLET ORAL 2 TIMES DAILY PRN
Status: DISCONTINUED | OUTPATIENT
Start: 2021-01-03 | End: 2021-01-05 | Stop reason: HOSPADM

## 2021-01-03 RX ADMIN — Medication 10 ML: at 21:25

## 2021-01-03 RX ADMIN — CLOZAPINE 200 MG: 100 TABLET ORAL at 21:19

## 2021-01-03 RX ADMIN — DIVALPROEX SODIUM 500 MG: 500 TABLET, DELAYED RELEASE ORAL at 21:19

## 2021-01-03 RX ADMIN — CLOZAPINE 100 MG: 100 TABLET ORAL at 08:07

## 2021-01-03 RX ADMIN — ACETAMINOPHEN 650 MG: 325 TABLET ORAL at 21:23

## 2021-01-03 RX ADMIN — SERTRALINE HYDROCHLORIDE 150 MG: 100 TABLET ORAL at 23:30

## 2021-01-03 RX ADMIN — ENOXAPARIN SODIUM 40 MG: 40 INJECTION SUBCUTANEOUS at 08:07

## 2021-01-03 RX ADMIN — ATORVASTATIN CALCIUM 20 MG: 20 TABLET, FILM COATED ORAL at 10:43

## 2021-01-03 RX ADMIN — MAGNESIUM SULFATE HEPTAHYDRATE 2 G: 40 INJECTION, SOLUTION INTRAVENOUS at 13:23

## 2021-01-03 RX ADMIN — DEXTROSE MONOHYDRATE 1 G: 5 INJECTION INTRAVENOUS at 17:57

## 2021-01-03 RX ADMIN — ASPIRIN 81 MG: 81 TABLET, CHEWABLE ORAL at 10:42

## 2021-01-03 RX ADMIN — BACLOFEN 5 MG: 10 TABLET ORAL at 21:19

## 2021-01-03 RX ADMIN — SERTRALINE HYDROCHLORIDE 150 MG: 100 TABLET ORAL at 10:42

## 2021-01-03 RX ADMIN — METOPROLOL TARTRATE 12.5 MG: 25 TABLET, FILM COATED ORAL at 10:43

## 2021-01-03 RX ADMIN — ACETAMINOPHEN 650 MG: 325 TABLET ORAL at 01:00

## 2021-01-03 RX ADMIN — DIVALPROEX SODIUM 500 MG: 500 TABLET, DELAYED RELEASE ORAL at 10:43

## 2021-01-03 RX ADMIN — METOPROLOL TARTRATE 12.5 MG: 25 TABLET, FILM COATED ORAL at 21:19

## 2021-01-03 RX ADMIN — POTASSIUM BICARBONATE 40 MEQ: 782 TABLET, EFFERVESCENT ORAL at 13:09

## 2021-01-03 RX ADMIN — ENOXAPARIN SODIUM 30 MG: 30 INJECTION SUBCUTANEOUS at 21:24

## 2021-01-03 RX ADMIN — OXYCODONE 5 MG: 5 TABLET ORAL at 05:38

## 2021-01-03 RX ADMIN — Medication 10 ML: at 08:05

## 2021-01-03 RX ADMIN — ACETAMINOPHEN 650 MG: 325 TABLET ORAL at 15:17

## 2021-01-03 ASSESSMENT — PAIN DESCRIPTION - PAIN TYPE
TYPE: ACUTE PAIN
TYPE: ACUTE PAIN

## 2021-01-03 ASSESSMENT — PAIN DESCRIPTION - ORIENTATION
ORIENTATION: RIGHT

## 2021-01-03 ASSESSMENT — PAIN DESCRIPTION - FREQUENCY
FREQUENCY: CONTINUOUS
FREQUENCY: INTERMITTENT

## 2021-01-03 ASSESSMENT — PAIN DESCRIPTION - PROGRESSION
CLINICAL_PROGRESSION: GRADUALLY WORSENING
CLINICAL_PROGRESSION: NOT CHANGED

## 2021-01-03 ASSESSMENT — PAIN DESCRIPTION - ONSET
ONSET: ON-GOING
ONSET: ON-GOING

## 2021-01-03 ASSESSMENT — PAIN - FUNCTIONAL ASSESSMENT
PAIN_FUNCTIONAL_ASSESSMENT: PREVENTS OR INTERFERES SOME ACTIVE ACTIVITIES AND ADLS
PAIN_FUNCTIONAL_ASSESSMENT: ACTIVITIES ARE NOT PREVENTED

## 2021-01-03 ASSESSMENT — PAIN DESCRIPTION - LOCATION: LOCATION: ELBOW

## 2021-01-03 ASSESSMENT — PAIN DESCRIPTION - DESCRIPTORS
DESCRIPTORS: ACHING
DESCRIPTORS: ACHING

## 2021-01-03 ASSESSMENT — PAIN SCALES - GENERAL
PAINLEVEL_OUTOF10: 1
PAINLEVEL_OUTOF10: 3

## 2021-01-03 NOTE — CONSULTS
History of Present Illness:  Jagdish Beavers is a 64 y.o. patient whom we were asked to evaluate for abn ekg/fall/fever. COVID pending. Chart/data reviewed. Presents to ER after fall. Getting out of BR when transferring from wheechair to bed slid down and fell on right side. No syncope. No hx of chest pain, sob, nausea fever. ER noted fever 101.7. Had elevated lactate 2.9 and noted trop of 0.02 and several thereafter of 0.01. No previous cardiac hx. Past Medical History:   has a past medical history of Bipolar 1 disorder ([facility] [address] Diabetes mellitus ([facility] [address] Dizziness, and Schizo-affective psychosis ([facility] [address] Surgical History:   has a past surgical history that includes Colonoscopy. Social History:   reports that he has never smoked. He does not have any smokeless tobacco history on file. He reports that he does not drink alcohol or use drugs. Family History:  No evidence for sudden cardiac death or premature CAD    Home Medications:  Were reviewed and are listed in nursing record. and/or listed below  Prior to Admission medications    Medication Sig Start Date End Date Taking?  Authorizing Provider   Baclofen (LIORESAL) 5 MG tablet Take 5 mg by mouth 2 times daily as needed (muscle spasms)  5/31/20  Yes Historical Provider, MD   divalproex (DEPAKOTE) 500 MG DR tablet Take 500 mg by mouth 2 times daily  6/22/20  Yes Historical Provider, MD   atorvastatin (LIPITOR) 20 MG tablet Take 20 mg by mouth daily  6/11/20  Yes Historical Provider, MD   sertraline (ZOLOFT) 100 MG tablet Take 150 mg by mouth daily  6/17/20  Yes Historical Provider, MD   Baclofen (LIORESAL) 5 MG tablet Take 5 mg by mouth nightly   Yes Historical Provider, MD   cloZAPine (CLOZARIL) 100 MG tablet Take 100 mg by mouth daily   Yes Historical Provider, MD   cloZAPine (CLOZARIL) 100 MG tablet Take 200 mg by mouth nightly   Yes Historical Provider, MD   acetaminophen (TYLENOL) 500 MG tablet Take 1,000 mg by mouth every 8 hours as of COVID-19 and also the need to preserve PPE for other caregivers, a face-to-face encounter with the patient was not performed. That being said, all relevant records and diagnostic tests were reviewed, including laboratory results and imaging. Please reference any relevant documentation elsewhere. Care will be coordinated with the primary service. Assessment  Patient Active Problem List   Diagnosis    Nonspecific abnormal serum enzyme levels    Chronic schizoaffective disorder with acute exacerbation (HCC)    Vitamin D deficiency    Nonspecific ST-T wave electrocardiographic changes    Abnormal EKG         Plan:    COVID pending. Chart/data reviewed. Admitted with fever 101.7 and elevated lactate. Has Twave changes by EKG. No hx of chest pain. Was tachy on admit. Empiric asa/B blocker. Though marginal trop on initial test when febrile has been 0.01 since. Echo when COVID issue settled.

## 2021-01-03 NOTE — PLAN OF CARE
Problem: Falls - Risk of:  Goal: Will remain free from falls  Description: Will remain free from falls  1/3/2021 1423 by Aayush Spann RN  Outcome: Met This Shift  Note: Side rails up x 2 call light in reach bed in low position bed/chair alarm on when in use     Problem: Falls - Risk of:  Goal: Absence of physical injury  Description: Absence of physical injury  Outcome: Met This Shift     Problem: Pain:  Description: Pain management should include both nonpharmacologic and pharmacologic interventions. Goal: Pain level will decrease  Description: Pain level will decrease  1/3/2021 1423 by Aayush Spann RN  Outcome: Met This Shift  Note: C/o rt. Elbow discomfort ice bag to area per Dr. Renner Wesley request     Problem: Pain:  Description: Pain management should include both nonpharmacologic and pharmacologic interventions. Goal: Control of acute pain  Description: Control of acute pain  Outcome: Met This Shift     Problem: Pain:  Description: Pain management should include both nonpharmacologic and pharmacologic interventions.   Goal: Control of chronic pain  Description: Control of chronic pain  Outcome: Met This Shift     Problem: Skin Integrity:  Goal: Will show no infection signs and symptoms  Description: Will show no infection signs and symptoms  1/3/2021 1423 by Aayush Spann RN  Outcome: Met This Shift  Note: See 4 eye assessment sheet     Problem: Skin Integrity:  Goal: Absence of new skin breakdown  Description: Absence of new skin breakdown  Outcome: Met This Shift     Problem: Airway Clearance - Ineffective  Goal: Achieve or maintain patent airway  Outcome: Met This Shift  Note: R/O COVID  isolation droplet+/ air born     Problem: Gas Exchange - Impaired  Goal: Absence of hypoxia  Outcome: Met This Shift  Note: Room air sats WNL's     Problem: Gas Exchange - Impaired  Goal: Absence of hypoxia  Outcome: Met This Shift  Note: Room air sats WNL's     Problem: Gas Exchange - Impaired  Goal:

## 2021-01-03 NOTE — PROGRESS NOTES
Attempted to call Javon Clemonsyolanad pt's parent with no answer, Skilled facility unable yo [provide any other numbers to me to contact.

## 2021-01-03 NOTE — PROGRESS NOTES
Hospitalist Progress Note      PCP: No primary care provider on file. Date of Admission: 1/2/2021    Chief Complaint: [facility] Course: Patient is 53-year-old gentleman resident of [facility] living [facility] with history of schizophrenia, depression presented to ER after he had a mechanical fall. On arrival to ER patient was noted to have fever 101. 7. Blood work significant for lactic acid of 2.9, initial troponin 0 0.02. Imaging including CT head, cervical spineCT abdomen pelvis minimal nonspecific groundglass opacities and consolidation in the lingula and left lower lobe. EKG showed sinus tachycardia, sinus rhythm with T wave inversion in lead II, III, aVF and precordial leads. Precordial lead T wave inversions are new compared to EKG with  last week. Patient is being admitted for new EKG changes. COVID 19 positive    Subjective: Patient is still report that his right shoulder and elbow is hurting. Denies any fever, chills, chest pain, shortness of breath, cough. No acute event reported overnight.       Medications:  Reviewed    Infusion Medications   Scheduled Medications    aspirin  81 mg Oral Daily    metoprolol tartrate  12.5 mg Oral BID    atorvastatin  20 mg Oral Daily    Baclofen  5 mg Oral Nightly    divalproex  500 mg Oral BID    sertraline  150 mg Oral Daily    potassium bicarb-citric acid  40 mEq Oral Once    magnesium sulfate  2 g Intravenous Once    cloZAPine  100 mg Oral Daily    sodium chloride flush  10 mL Intravenous 2 times per day    enoxaparin  40 mg Subcutaneous Daily    cefTRIAXone (ROCEPHIN) IV  1 g Intravenous Q24H    cloZAPine  200 mg Oral Nightly     PRN Meds: Baclofen, perflutren lipid microspheres, sodium chloride flush, promethazine **OR** ondansetron, polyethylene glycol, acetaminophen **OR** acetaminophen      Intake/Output Summary (Last 24 hours) at 1/3/2021 1218  Last data filed at 1/3/2021 1040  Gross per 24 hour   Intake 640 ml   Output --   Net 640 ml       Physical Exam Performed:    /83   Pulse 93   Temp 98.9 °F (37.2 °C) (Oral)   Resp 18   Ht 5' 11\" (1.803 m)   Wt 209 lb 3.5 oz (94.9 kg)   SpO2 94%   BMI 29.18 kg/m²     General appearance: NAD., appears stated age and cooperative. HEENT: Pupils equal, round, and reactive to light. Conjunctivae/corneas clear. Neck: Supple, with full range of motion. No jugular venous distention. Trachea midline. Respiratory:  Normal respiratory effort. Clear to auscultation, bilaterally without Rales/Wheezes/Rhonchi. Cardiovascular: Regular rate and rhythm with normal S1/S2 without murmurs, rubs or gallops. Abdomen: Soft, non-tender, non-distended with normal bowel sounds. Musculoskeletal: No clubbing, cyanosis or edema bilaterally. Full range of motion without deformity. Skin: Skin color, texture, turgor normal.  No rashes or lesions. Neurologic:  Neurovascularly intact without any focal sensory/motor deficits. Cranial nerves: II-XII intact, grossly non-focal.  Psychiatric: Alert and oriented, thought content appropriate, normal insight  Capillary Refill: Brisk,< 3 seconds   Peripheral Pulses: +2 palpable, equal bilaterally       Labs:   Recent Labs     01/02/21  1239 01/03/21  0602   WBC 6.8 9.8   HGB 14.0 13.6   HCT 42.0 40.7   * 106*     Recent Labs     01/02/21  1239 01/03/21  0602    138   K 3.6 3.3*    103   CO2 23 23   BUN 14 10   CREATININE 1.0 0.8*   CALCIUM 9.3 8.6     Recent Labs     01/02/21  1239   AST 26   ALT 22   BILITOT 0.3   ALKPHOS 48     No results for input(s): INR in the last 72 hours.   Recent Labs     01/02/21  1239 01/02/21  1411 01/02/21  1933   TROPONINI 0.02* <0.01 <0.01       Urinalysis:      Lab Results   Component Value Date    NITRU Negative 01/02/2021    WBCUA 3-5 01/02/2021    BACTERIA Rare 01/02/2021    RBCUA 5-10 01/02/2021    BLOODU TRACE-INTACT 01/02/2021    SPECGRAV 1.015 01/02/2021    GLUCOSEU Negative 01/02/2021 Radiology:          Assessment/Plan:    Active Hospital Problems    Diagnosis Date Noted    Abnormal EKG [R94.31] 01/03/2021    Nonspecific ST-T wave electrocardiographic changes [R94.31] 01/02/2021     #COVID-19 pneumonia  Patient is on room air. Check inflammatory markers including LDH, ferritin, D-dimer, fibrinogen  No need of Decadron as patient is not hypoxic  Legionella, strep pneumo antigen negative. #EKG changes could be related to COVID-19 pneumonia  Cardiology consult reviewed and appreciated recommended echocardiogram.    #Mechanical fall  #Right elbow pain  Ice packs  PT/OT has been consulted. #Fever likely secondary to COVID-19 pneumonia  Due to elevated procalcitonin level would continue Rocephin for now. #Schizophrenia stable. Continue Procrit, clozapine    #Chronic pain  Continue baclofen. #Electrolyte abnormality  Replaced will recheck. DVT Prophylaxis: lovenox  Diet: DIET GENERAL;  Code Status: Full Code    PT/OT Eval Status: in progress    Dispo - inpatient. Monitor for another 24 hours. Echo.      This chart was likely completed using voice recognition technology and may contain unintended grammatical , phraseology,and/or punctuation errors      Jase Cross MD

## 2021-01-03 NOTE — PROGRESS NOTES
4 Eyes Admission Assessment     I agree as the admission nurse that 2 RN's have performed a thorough Head to Toe Skin Assessment on the patient. ALL assessment sites listed below have been assessed on admission. Areas assessed by both nurses:   [x]   Head, Face, and Ears   [x]   Shoulders, Back, and Chest  [x]   Arms, Elbows, and Hands   [x]   Coccyx, Sacrum, and Ischium  [x]   Legs, Feet, and Heels        Does the Patient have Skin Breakdown?   No         Jerome Prevention initiated:  Yes   Wound Care Orders initiated:  No      [facility] nurse consulted for Pressure Injury (Stage 3,4, Unstageable, DTI, NWPT, and Complex wounds) or Jerome score 18 or lower:  No      Nurse 1 eSignature: Electronically signed by Melisa Alberto RN on 1/2/21 at 10:56 PM EST    **SHARE this note so that the co-signing nurse is able to place an eSignature**    Nurse 2 eSignature: Electronically signed by Natalie Castellano RN on 1/3/21 at 2:28 AM EST

## 2021-01-03 NOTE — PROGRESS NOTES
Called both numbers listed for June in pt contacts no answer to up-date, Darrell's listed as a contact not available

## 2021-01-04 VITALS
DIASTOLIC BLOOD PRESSURE: 84 MMHG | WEIGHT: 209.22 LBS | HEIGHT: 71 IN | SYSTOLIC BLOOD PRESSURE: 123 MMHG | RESPIRATION RATE: 16 BRPM | TEMPERATURE: 97.5 F | HEART RATE: 80 BPM | OXYGEN SATURATION: 98 % | BODY MASS INDEX: 29.29 KG/M2

## 2021-01-04 PROBLEM — U07.1 COVID-19: Status: ACTIVE | Noted: 2021-01-04

## 2021-01-04 LAB
A/G RATIO: 1.2 (ref 1.1–2.2)
ALBUMIN SERPL-MCNC: 3.6 G/DL (ref 3.4–5)
ALP BLD-CCNC: 54 U/L (ref 40–129)
ALT SERPL-CCNC: 30 U/L (ref 10–40)
ANION GAP SERPL CALCULATED.3IONS-SCNC: 10 MMOL/L (ref 3–16)
AST SERPL-CCNC: 123 U/L (ref 15–37)
BASOPHILS ABSOLUTE: 0 K/UL (ref 0–0.2)
BASOPHILS RELATIVE PERCENT: 0.2 %
BILIRUB SERPL-MCNC: <0.2 MG/DL (ref 0–1)
BUN BLDV-MCNC: 10 MG/DL (ref 7–20)
CALCIUM SERPL-MCNC: 8.6 MG/DL (ref 8.3–10.6)
CHLORIDE BLD-SCNC: 106 MMOL/L (ref 99–110)
CO2: 24 MMOL/L (ref 21–32)
CREAT SERPL-MCNC: 0.9 MG/DL (ref 0.9–1.3)
D DIMER: <200 NG/ML DDU (ref 0–229)
EOSINOPHILS ABSOLUTE: 0.2 K/UL (ref 0–0.6)
EOSINOPHILS RELATIVE PERCENT: 2.5 %
FIBRINOGEN: 395 MG/DL (ref 200–397)
GFR AFRICAN AMERICAN: >60
GFR NON-AFRICAN AMERICAN: >60
GLOBULIN: 3 G/DL
GLUCOSE BLD-MCNC: 110 MG/DL (ref 70–99)
HCT VFR BLD CALC: 40.4 % (ref 40.5–52.5)
HEMOGLOBIN: 13.5 G/DL (ref 13.5–17.5)
LYMPHOCYTES ABSOLUTE: 3.1 K/UL (ref 1–5.1)
LYMPHOCYTES RELATIVE PERCENT: 41 %
MCH RBC QN AUTO: 29.8 PG (ref 26–34)
MCHC RBC AUTO-ENTMCNC: 33.5 G/DL (ref 31–36)
MCV RBC AUTO: 89.2 FL (ref 80–100)
MONOCYTES ABSOLUTE: 0.6 K/UL (ref 0–1.3)
MONOCYTES RELATIVE PERCENT: 7.3 %
NEUTROPHILS ABSOLUTE: 3.7 K/UL (ref 1.7–7.7)
NEUTROPHILS RELATIVE PERCENT: 49 %
PDW BLD-RTO: 14.8 % (ref 12.4–15.4)
PLATELET # BLD: 104 K/UL (ref 135–450)
PMV BLD AUTO: 10.8 FL (ref 5–10.5)
POTASSIUM REFLEX MAGNESIUM: 3.7 MMOL/L (ref 3.5–5.1)
RBC # BLD: 4.53 M/UL (ref 4.2–5.9)
SODIUM BLD-SCNC: 140 MMOL/L (ref 136–145)
TOTAL PROTEIN: 6.6 G/DL (ref 6.4–8.2)
WBC # BLD: 7.6 K/UL (ref 4–11)

## 2021-01-04 PROCEDURE — 97530 THERAPEUTIC ACTIVITIES: CPT

## 2021-01-04 PROCEDURE — 6370000000 HC RX 637 (ALT 250 FOR IP): Performed by: INTERNAL MEDICINE

## 2021-01-04 PROCEDURE — 85025 COMPLETE CBC W/AUTO DIFF WBC: CPT

## 2021-01-04 PROCEDURE — 2580000003 HC RX 258: Performed by: INTERNAL MEDICINE

## 2021-01-04 PROCEDURE — 6360000002 HC RX W HCPCS: Performed by: INTERNAL MEDICINE

## 2021-01-04 PROCEDURE — 85379 FIBRIN DEGRADATION QUANT: CPT

## 2021-01-04 PROCEDURE — 85384 FIBRINOGEN ACTIVITY: CPT

## 2021-01-04 PROCEDURE — 80053 COMPREHEN METABOLIC PANEL: CPT

## 2021-01-04 PROCEDURE — 2060000000 HC ICU INTERMEDIATE R&B

## 2021-01-04 PROCEDURE — 97161 PT EVAL LOW COMPLEX 20 MIN: CPT

## 2021-01-04 PROCEDURE — 97166 OT EVAL MOD COMPLEX 45 MIN: CPT

## 2021-01-04 RX ORDER — ASPIRIN 81 MG/1
81 TABLET, CHEWABLE ORAL DAILY
Qty: 30 TABLET | Refills: 3 | Status: SHIPPED | OUTPATIENT
Start: 2021-01-05

## 2021-01-04 RX ADMIN — ACETAMINOPHEN 650 MG: 325 TABLET ORAL at 21:01

## 2021-01-04 RX ADMIN — DIVALPROEX SODIUM 500 MG: 500 TABLET, DELAYED RELEASE ORAL at 21:02

## 2021-01-04 RX ADMIN — METOPROLOL TARTRATE 12.5 MG: 25 TABLET, FILM COATED ORAL at 21:02

## 2021-01-04 RX ADMIN — Medication 10 ML: at 10:17

## 2021-01-04 RX ADMIN — ENOXAPARIN SODIUM 30 MG: 30 INJECTION SUBCUTANEOUS at 10:17

## 2021-01-04 RX ADMIN — ACETAMINOPHEN 650 MG: 325 TABLET ORAL at 10:17

## 2021-01-04 RX ADMIN — BACLOFEN 5 MG: 10 TABLET ORAL at 21:02

## 2021-01-04 RX ADMIN — CLOZAPINE 100 MG: 100 TABLET ORAL at 10:16

## 2021-01-04 RX ADMIN — CLOZAPINE 200 MG: 100 TABLET ORAL at 21:02

## 2021-01-04 RX ADMIN — ENOXAPARIN SODIUM 30 MG: 30 INJECTION SUBCUTANEOUS at 21:01

## 2021-01-04 RX ADMIN — DIVALPROEX SODIUM 500 MG: 500 TABLET, DELAYED RELEASE ORAL at 10:17

## 2021-01-04 RX ADMIN — SERTRALINE HYDROCHLORIDE 150 MG: 100 TABLET ORAL at 21:01

## 2021-01-04 RX ADMIN — ATORVASTATIN CALCIUM 20 MG: 20 TABLET, FILM COATED ORAL at 13:19

## 2021-01-04 RX ADMIN — METOPROLOL TARTRATE 12.5 MG: 25 TABLET, FILM COATED ORAL at 10:17

## 2021-01-04 RX ADMIN — ASPIRIN 81 MG: 81 TABLET, CHEWABLE ORAL at 10:16

## 2021-01-04 ASSESSMENT — PAIN DESCRIPTION - ORIENTATION
ORIENTATION: RIGHT
ORIENTATION: RIGHT

## 2021-01-04 ASSESSMENT — PAIN DESCRIPTION - LOCATION
LOCATION: ELBOW
LOCATION: ELBOW;LEG

## 2021-01-04 ASSESSMENT — PAIN SCALES - GENERAL
PAINLEVEL_OUTOF10: 1
PAINLEVEL_OUTOF10: 3
PAINLEVEL_OUTOF10: 0
PAINLEVEL_OUTOF10: 0

## 2021-01-04 ASSESSMENT — PAIN DESCRIPTION - FREQUENCY
FREQUENCY: CONTINUOUS
FREQUENCY: CONTINUOUS

## 2021-01-04 ASSESSMENT — PAIN DESCRIPTION - PAIN TYPE: TYPE: ACUTE PAIN

## 2021-01-04 ASSESSMENT — PAIN DESCRIPTION - ONSET: ONSET: ON-GOING

## 2021-01-04 NOTE — PLAN OF CARE
Problem: Falls - Risk of:  Goal: Will remain free from falls  Description: Will remain free from falls  1/4/2021 0201 by Adali Malcolm RN  Outcome: Ongoing  Note: Pt is a Fall Risk. See [facility] Fall Risk Score. Pt bed in low position and side rails up and bed alarm on. Call light and belongings in reach. Pt encouraged to call for assistance. Will continue with hourly rounds for PO intake, pain needs, toileting, and repositioning as needed. Problem: Airway Clearance - Ineffective  Goal: Achieve or maintain patent airway  1/4/2021 0201 by Adali Malcolm RN  Outcome: Ongoing  Note: Pt on RA with O2 saturations in the mid 90's. No complaints of SOB.  Will continue to monitor

## 2021-01-04 NOTE — PROGRESS NOTES
Physical Therapy    Facility/Department: [place] [address] [facility]  Initial Assessment and Treatment    NAME: Carlo Hernadez  : 1960  MRN: [id number]    Date of Service: 2021    Discharge Recommendations:    Carlo Hernadez scored a 13/24 on the AM-PAC short mobility form. Current research shows that an AM-PAC score of 17 or less is typically not associated with a discharge to the patient's home setting. Based on the patient's AM-PAC score and their current functional mobility deficits, it is recommended that the patient have 3-5 sessions per week of Physical Therapy at d/c to increase the patient's independence. Please see assessment section for further patient specific details. If patient discharges prior to next session this note will serve as a discharge summary. Please see below for the latest assessment towards goals. PT Equipment Recommendations  Equipment Needed: No    Assessment   Body structures, Functions, Activity limitations: Decreased functional mobility ; Decreased endurance; Increased pain  Assessment: Pt normally transfers independently bed to [facility] facility. Currently he needs assist of one to two for safe functional mobility. Recommend continued IP therapies to maximize functional mobility and safety. Treatment Diagnosis: Decreased functional mobility post fall. PT Education: General Safety;PT Role;Functional Mobility Training;Transfer Training  Patient Education: Pt will benefit from reinforcement  Barriers to Learning: some cognitive issues  Activity Tolerance  Activity Tolerance: Patient limited by fatigue;Patient limited by pain; Patient limited by endurance  Activity Tolerance: Pt refused further activity, stating fatigue. Patient Diagnosis(es): The primary encounter diagnosis was Fall from bed, initial encounter. A diagnosis of T wave inversion in EKG was also pertinent to this visit.      has a past medical history of Bipolar 1 disorder ([facility] [address] Diabetes mellitus ([facility] [address] pt request)  Scooting: Stand by assistance  Transfers  Sit to Stand: Minimal Assistance; Moderate Assistance;2 Person Assistance(First sit to stand with min x 1, hands on walker. Second attempt - pt unable to stand with mod to max x 2)  Stand to sit: Minimal Assistance  Ambulation  Ambulation?: No     Balance  Comments: Good static sitting on EOB with SBA. Pt stood for 20 sec with RW and min x 1 with good stability prior to needing to sit down.         Plan   Plan  Times per week: 1-2  Times per day: Daily  Current Treatment Recommendations: Transfer Training, Functional Mobility Training, Endurance Training, Safety Education & Training, Gait Training  Safety Devices  Type of devices: Call light within reach, Nurse notified, Bed alarm in place, Left in bed    AM-PAC Score  AM-PAC Inpatient Mobility Raw Score : 13 (01/04/21 1008)  AM-PAC Inpatient T-Scale Score : 36.74 (01/04/21 1008)  Mobility Inpatient CMS 0-100% Score: 64.91 (01/04/21 1008)  Mobility Inpatient CMS G-Code Modifier : CL (01/04/21 1008)          Goals  Short term goals  Time Frame for Short term goals: Discharge  Short term goal 1: Mod I bed mobility  Short term goal 2: Transfers with CG  Short term goal 3: Ambulate 30 ft with RW and min assist  Patient Goals   Patient goals : Not specifically stated       Therapy Time   Individual Concurrent Group Co-treatment   Time In 0820         Time Out 0900         Minutes 40               Timed Code Treatment Minutes:   25    Total Treatment Minutes:  [place], [id number]

## 2021-01-04 NOTE — CARE COORDINATION
Case Management Assessment            Discharge Note                    Date / Time of Note: 1/4/2021 1:31 PM                  Discharge Note Completed by: Librado Boone    Patient Name: Rohan Shane   YOB: 1960  Diagnosis: Nonspecific ST-T wave electrocardiographic changes [R94.31]  COVID-19 [U07.1]   Date / Time: 1/2/2021 11:38 AM    Current PCP: No primary care provider on file. Clinic patient: No    Hospitalization in the last 30 days: No    Advance Directives:  Code Status: Full Code  [place] DNR form completed and on chart: Not Indicated    Financial:  Payor: Mandeep Mittal / Plan: Mandeep Mittal / Product Type: *No Product type* /      Pharmacy:  No Pharmacies [id number] medications?: Potential Assistance Purchasing Medications: No  Assistance provided by Case Management: None at this time    Does patient want to participate in local refill/ meds to beds program?: No    Meds To Beds General Rules:  1. Can ONLY be done Monday- Friday between 8:30am-5pm  2. Prescription(s) must be in pharmacy by 3pm to be filled same day  3. Copy of patient's insurance/ prescription drug card and patient face sheet must be sent along with the prescription(s)  4. Cost of Rx cannot be added to hospital bill. If financial assistance is needed, please contact unit  or ;  or  CANNOT provide pharmacy voucher for patients co-pays  5.  Patients can then  the prescription on their way out of the hospital at discharge, or pharmacy can deliver to the bedside if staff is available. (payment due at time of pick-up or delivery - cash, check, or card accepted)     Able to afford home medications/ co-pay costs: Yes    ADLS:  Current PT AM-PAC Score: 13 /24  Current OT AM-PAC Score: 13 /24      DISCHARGE Disposition: [facility] ([facility]): Dante Gomez Phone: [phone] Fax: [phone]    LOC at discharge: Not Applicable  DAVID Completed: Not Indicated    Notification completed in HENS/PAS?:  Yes : CM has completed HENS online through secure website for SNF admission at [facility]. Document ID #: [id number]    IMM Completed:   Not Indicated    Transportation:  Transportation PLAN for discharge: EMS transportation   Mode of Transport: Ambulance stretcher - BLS  Reason for medical transport: Special handling en route- isolation precautions  Name of [address] [place] [address], [address]: [address]   Phone: [phone]  Time of Transport: 1800    Transport form completed: Yes    Home Care:  [address] ordered at discharge: Not [address]: Not Applicable  Orders faxed: No        Hospice Services:  Location: Not Applicable  Agency: Not Applicable    Consents signed: Not Indicated    Referrals made at [facility] for outpatient continued care:  Not Applicable    Additional CM Notes: Patient will discharge to SNF at [facility]. Orders faxed to [phone], nurse to call report to [phone]. Patient is scheduled for transport via [address] at 1800. The Plan for Transition of Care is related to the following treatment goals of Nonspecific ST-T wave electrocardiographic changes [R94.31]  COVID-19 [U07.1]    The Patient and/or patient representative Katia Obrien and his family were provided with a choice of provider and agrees with the discharge plan Yes    Freedom of choice list was provided with basic dialogue that supports the patient's individualized plan of care/goals and shares the quality data associated with the providers.  Yes    Care Transitions patient: No    Abdirahman Del aCstillo RN  [facility] [employer]  Case Management Department  Ph: [phone]  Fax: [phone]

## 2021-01-04 NOTE — PROGRESS NOTES
Report called and given to Love So at  [facility]. All questions answered at this time.  Electronically signed by Maura Sanches RN on 1/4/2021 at 4:45 PM

## 2021-01-04 NOTE — DISCHARGE SUMMARY
Hospital Medicine Discharge Summary    Patient ID: Galina Mir      Patient's PCP: No primary care provider on file. Admit Date: 1/2/2021     Discharge Date:   1/4/2021    Admitting Physician: Katia Lanza MD     Discharge Physician: Katia Lanza MD     Discharge Diagnoses: Active Hospital Problems    Diagnosis Date Noted    COVID-19 [U07.1] 01/04/2021    Abnormal EKG [R94.31] 01/03/2021    Nonspecific ST-T wave electrocardiographic changes [R94.31] 01/02/2021       The patient was seen and examined on day of discharge and this discharge summary is in conjunction with any daily progress note from day of discharge. Hospital Course: Patient is 77-year-old gentleman resident of [address] [place] [address] assisted living facility with history of schizophrenia, depression presented to ER after he had a mechanical fall.  On arrival to ER patient was noted to have fever 101. 7. Blood work significant for lactic acid of 2.9, initial troponin 0 0.02. Imaging including CT head, cervical spineCT abdomen pelvis minimal nonspecific groundglass opacities and consolidation in the lingula and left lower lobe.     EKG showed sinus tachycardia, sinus rhythm with T wave inversion in lead II, III, aVF and precordial leads.  Precordial lead T wave inversions are new compared to EKG with  last week.  Patient is being admitted for new EKG changes.     COVID 19 positive. On RA. #COVID-19 pneumonia on room air no need for Decadron. #EKG changes could be related to COVID-19 pneumonia. Discussed with cardiology EKG changes could be related to COVID-19 pneumonia  Patient can be discharged on aspirin, statin, beta-blocker.     #Mechanical fall  #Right elbow pain  Ice packs  Imaging no fracture  PT/OT consult completed recommending SNF placement.     #Fever likely secondary to COVID-19 pneumonia  Discontinue Rocephin at time of discharge. Sepsis was due to COVID-19 pneumonia.     #Schizophrenia stable.       Physical Exam Performed:     /89   Pulse 84   Temp 97.5 °F (36.4 °C) (Oral)   Resp 16   Ht 5' 11\" (1.803 m)   Wt 209 lb 3.5 oz (94.9 kg)   SpO2 98%   BMI 29.18 kg/m²       General appearance: Lying comfortably on bed. HEENT: Pupils equal, round, and reactive to light. Conjunctivae/corneas clear. Neck: Supple, with full range of motion. No jugular venous distention. Trachea midline. Respiratory:  Normal respiratory effort. Clear to auscultation, bilaterally without Rales/Wheezes/Rhonchi. Cardiovascular: Regular rate and rhythm with normal S1/S2 without murmurs, rubs or gallops. Abdomen: Soft, non-tender, non-distended with normal bowel sounds. Musculoskeletal: No clubbing, cyanosis or edema bilaterally. Full range of motion without deformity. Skin: Skin color, texture, turgor normal.  No rashes or lesions. Neurologic:  Neurovascularly intact without any focal sensory/motor deficits. Cranial nerves: II-XII intact, grossly non-focal.  Psychiatric: Alert and oriented,      Labs:  For convenience and continuity at follow-up the following most recent labs are provided:      CBC:    Lab Results   Component Value Date    WBC 7.6 01/04/2021    HGB 13.5 01/04/2021    HCT 40.4 01/04/2021     01/04/2021       Renal:    Lab Results   Component Value Date     01/04/2021    K 3.7 01/04/2021     01/04/2021    CO2 24 01/04/2021    BUN 10 01/04/2021    CREATININE 0.9 01/04/2021    CALCIUM 8.6 01/04/2021    PHOS 3.0 01/09/2014         Significant Diagnostic Studies    Radiology:          Consults:     IP CONSULT TO HOSPITALIST  IP CONSULT TO PHARMACY  IP CONSULT TO SOCIAL WORK  IP CONSULT TO CARDIOLOGY    Disposition:  SNF    Condition at Discharge: Stable    Discharge Instructions/Follow-up:  PCP and Cardiology    Code Status:  Full Code     Activity: activity as tolerated    Diet: cardiac diet      Discharge Medications:     Current Discharge Medication List           Details   metoprolol tartrate (LOPRESSOR) 25 MG tablet Take 0.5 tablets by mouth 2 times daily  Qty: 60 tablet, Refills: 3      aspirin 81 MG chewable tablet Take 1 tablet by mouth daily  Qty: 30 tablet, Refills: 3              Details   !! Baclofen (LIORESAL) 5 MG tablet Take 5 mg by mouth 2 times daily as needed (muscle spasms)       divalproex (DEPAKOTE) 500 MG DR tablet Take 500 mg by mouth 2 times daily       atorvastatin (LIPITOR) 20 MG tablet Take 20 mg by mouth daily       sertraline (ZOLOFT) 100 MG tablet Take 150 mg by mouth daily       !! Baclofen (LIORESAL) 5 MG tablet Take 5 mg by mouth nightly      !! cloZAPine (CLOZARIL) 100 MG tablet Take 100 mg by mouth daily      !! cloZAPine (CLOZARIL) 100 MG tablet Take 200 mg by mouth nightly      acetaminophen (TYLENOL) 500 MG tablet Take 1,000 mg by mouth every 8 hours as needed for Pain      famotidine (PEPCID) 20 MG tablet Take 20 mg by mouth daily       Multiple Vitamins-Minerals (THERAPEUTIC MULTIVITAMIN-MINERALS) tablet Take 1 tablet by mouth daily      Omega-3 Fatty Acids (FISH OIL) 1000 MG CAPS Take 1,000 mg by mouth 2 times daily      ibuprofen (ADVIL;MOTRIN) 800 MG tablet Take 800 mg by mouth every 8 hours as needed for Pain      melatonin 3 MG TABS tablet Take 3 mg by mouth nightly      vitamin B-12 (CYANOCOBALAMIN) 1000 MCG tablet Take 1,000 mcg by mouth daily      vitamin D (ERGOCALCIFEROL) 1.25 MG (71378 UT) CAPS capsule Take 50,000 Units by mouth once a week Saturdays       ! ! - Potential duplicate medications found. Please discuss with provider. Time Spent on discharge is more than 30 minutes in the examination, evaluation, counseling and review of medications and discharge plan. Signed:    Manju Spivey MD   1/4/2021      Thank you No primary care provider on file. for the opportunity to be involved in this patient's care. If you have any questions or concerns please feel free to contact me at [phone].

## 2021-01-04 NOTE — PROGRESS NOTES
GLUCOSEU Negative 01/02/2021       Radiology:          Assessment/Plan:    Active Hospital Problems    Diagnosis Date Noted    COVID-19 [U07.1] 01/04/2021    Abnormal EKG [R94.31] 01/03/2021    Nonspecific ST-T wave electrocardiographic changes [R94.31] 01/02/2021     #COVID-19 pneumonia  Patient is on room air. Inflammatory markers are stable. No need of Decadron as patient is not hypoxic  Legionella, strep pneumo antigen negative. #EKG changes could be related to COVID-19 pneumonia  Discussed with cardiology EKG changes could be related to COVID-19 pneumonia  Patient can be discharged on aspirin, statin, beta-blocker. #Mechanical fall  #Right elbow pain  Ice packs  PT/OT consult completed recommending SNF placement. #Fever likely secondary to COVID-19 pneumonia  Discontinue Rocephin at time of discharge. Sepsis was due to COVID-19 pneumonia. #Schizophrenia stable. Continue Procrit, clozapine    #Chronic pain  Continue baclofen. #Electrolyte abnormality  Resolved. DVT Prophylaxis: lovenox  Diet: DIET GENERAL;  Code Status: Full Code    PT/OT Eval Status: Completed    Dispo -medically stable to be discharged to nursing home once bed is available.     This chart was likely completed using voice recognition technology and may contain unintended grammatical , phraseology,and/or punctuation errors      Paola Menezes MD

## 2021-01-04 NOTE — PROGRESS NOTES
Occupational Therapy   Occupational Therapy Initial Assessment and Treatment  Date: 2021   Patient Name: Lizeth Hutson  MRN: [id number]     : 1960    Date of Service: 2021    Discharge Recommendations: Lizeth Hutson scored a 13/24 on the AM-PAC ADL Inpatient form. Current research shows that an AM-PAC score of 17 or less is typically not associated with a discharge to the patient's home setting. Based on the patient's AM-PAC score and their current ADL deficits, it is recommended that the patient have 3-5 sessions per week of Occupational Therapy at d/c to increase the patient's independence. Please see assessment section for further patient specific details. If patient discharges prior to next session this note will serve as a discharge summary. Please see below for the latest assessment towards goals. OT Equipment Recommendations  Equipment Needed: No    Assessment   Performance deficits / Impairments: Decreased functional mobility ; Decreased ADL status; Decreased ROM; Decreased strength;Decreased cognition;Decreased endurance;Decreased high-level IADLs;Decreased fine motor control;Decreased coordination  Assessment: Pt normally transfers independently bed to [facility] facility. Difficult to obtain history and PLOF s/t patient cognition and participation in session. Pt with decreased fxl mobility, ADL, strength, coordination and endurance this date. Unclear of basline, pt does use walker and w/c at facility. Pt currently requires Don with sit to stand, unable to assess mobility/transfers this date. CGA to Don with bed mobility at this time. Pt would benefit from skilled OT services to increase safety and IND with fxl mobility, transfers, and ADL. Will follow.   Prognosis: Good  Decision Making: Medium Complexity  OT Education: OT Role;Plan of Care  Patient Education: requires reinforcement  REQUIRES OT FOLLOW UP: Yes  Activity Tolerance  Activity Tolerance: Patient limited by fatigue  Safety Devices  Safety Devices in place: Yes  Type of devices: Left in bed;Nurse notified;Call light within reach; Bed alarm in place; All fall risk precautions in place;Gait belt;Patient at risk for falls           Patient Diagnosis(es): The primary encounter diagnosis was Fall from bed, initial encounter. A diagnosis of T wave inversion in EKG was also pertinent to this visit. has a past medical history of Bipolar 1 disorder ([facility] [address] Diabetes mellitus ([facility] [address] Dizziness, and Schizo-affective psychosis ([facility] [address] has a past surgical history that includes Colonoscopy. Restrictions  Position Activity Restriction  Other position/activity restrictions: up as tolerated    Subjective   General  Chart Reviewed: Yes  Patient assessed for rehabilitation services?: Yes  Additional Pertinent Hx: 61-year-old gentleman resident of [address] [place] [address] assisted living [facility] with history of schizophrenia, depression presented to ER after he had a mechanical fall. On arrival to ER patient was noted to have fever 101.7. EKG showed sinus tachycardia, sinus rhythm with T wave inversion in lead II, III, aVF and precordial leads. Patient is being admitted for new EKG changes. COVID 19 positive. PMH  past medical history of Bipolar 1 disorder, Diabetes mellitus, Dizziness, and Schizo-affective psychosis.   Diagnosis: T wave, EKG changes, covid 19  Subjective  Subjective: pt in bed upon arrival, agreeabele to therapy session, requesting back to bed at end of sesson stating \"I am very tired\"  Patient Currently in Pain: Yes  Pain Assessment  Pain Assessment: 0-10  Pain Level: 3  Patient's Stated Pain Goal: No pain  Pain Type: Acute pain  Pain Location: Elbow;Leg  Pain Orientation: Right  Pain Descriptors: Aching  Pain Frequency: Continuous  Pain Onset: On-going(tylenol given)  Clinical Progression: Not changed  Functional Pain Assessment: Prevents or interferes some active activities and ADLs  Non-Pharmaceutical Pain Intervention(s): Rest;Repositioned  Vital Signs  Temp: 97.5 °F (36.4 °C)  Temp Source: Oral  Pulse: 84  Heart Rate Source: Monitor  Resp: 16  BP: 129/89  BP Location: Left upper arm  MAP (mmHg): 102  Patient Position: Semi fowlers  Level of Consciousness: Alert (0)  MEWS Score: 1  Patient Currently in Pain: Yes  Oxygen Therapy  SpO2: 98 %  Pulse Oximeter Device Mode: Intermittent  Pulse Oximeter Device Location: Finger  O2 Device: None (Room air)  Social/Functional History  Social/Functional History  Lives With: Alone  Type of Home: Facility([facility])  Ambulation Assistance: (primarily uses WC)  Transfer Assistance: (uses walker to help with transfers)       Objective        Orientation  Overall Orientation Status: (oriented to self and where he lives, situation)     Balance  Sitting Balance: Stand by assistance(sat EOB ~7 min SBA)  Standing Balance: Minimal assistance(attempt sit to stand x2. 1st stand 20 sec with Don and RW, 2nd stand unsuccesful with ModA)  Standing Balance  Time: 20 sec  Activity: sit to stand EOB  Functional Mobility  Functional - Mobility Device: Rolling Walker  ADL  LE Dressing: Dependent/Total  Additional Comments: declined ADL this date        Bed mobility  Supine to Sit: Contact guard assistance(HOB elevated)  Sit to Supine: Minimal assistance(assist with legs)  Scooting: Stand by assistance  Transfers  Sit to stand: Minimal assistance  Stand to sit: Minimal assistance     Cognition  Overall Cognitive Status: Exceptions  Following Commands:  Follows one step commands with increased time  Attention Span: Attends with cues to redirect  Safety Judgement: Decreased awareness of need for assistance  Problem Solving: Assistance required to generate solutions  Initiation: Requires cues for some                 LUE AROM (degrees)  LUE AROM : WFL  Left Hand AROM (degrees)  Left Hand AROM: WFL  RUE AROM (degrees)  RUE AROM : WFL  Right Hand AROM (degrees)  Right Hand AROM: WFL  LUE Strength  Gross LUE Strength: Exceptions to [facility]  LU Strength Comment: strength decreased this date, not formally assessed s/t cognition and participation  RUE Strength  Gross RUE Strength: Exceptions to [facility]  Times per week: 1-2  Times per day: Daily  Current Treatment Recommendations: Strengthening, ROM, Functional Mobility Training, Endurance Training, Safety Education & Training, Patient/Caregiver Education & Training, Self-Care / ADL      AM-PAC Score        AM-PAC Inpatient Daily Activity Raw Score: 13 (01/04/21 1018)  AM-PAC Inpatient ADL T-Scale Score : 32.03 (01/04/21 1018)  ADL Inpatient CMS 0-100% Score: 63.03 (01/04/21 1018)  ADL Inpatient CMS G-Code Modifier : CL (01/04/21 1018)    Goals  Short term goals  Time Frame for Short term goals: d/c  Short term goal 1: pt will be SBA with bed mobility  Short term goal 2: pt will be CGA with sit to stand  Short term goal 3: pt will be Don with fxl transfer from EOB to chair  Short term goal 4: pt will be ModA with fxl mobility using RW  Short term goal 5: pt will be supervision with simple grooming tasks       Therapy Time   Individual Concurrent Group Co-treatment   Time In 0823         Time Out 0902         Minutes 39           Timed Code Treatment Minutes:   25    Total Treatment Minutes:  Angelica Hodge 1444, OTR/L

## 2021-01-05 NOTE — PROGRESS NOTES
Pt discharged to [facility] via transport. Pt discharged with all belongings. IV was removed and pt was taken off telemetry. [facility] notified of pt's departure and on administration of medications.  Electronically signed by Cecelia Pickens RN on 1/4/2021 at 11:37 PM

## 2021-02-25 ENCOUNTER — APPOINTMENT (OUTPATIENT)
Dept: CT IMAGING | Age: 61
End: 2021-02-25
Payer: MEDICARE

## 2021-02-25 ENCOUNTER — HOSPITAL ENCOUNTER (EMERGENCY)
Age: 61
Discharge: NURSING, REHAB OR LONG-TERM CARE | End: 2021-02-26
Attending: EMERGENCY MEDICINE
Payer: MEDICARE

## 2021-02-25 DIAGNOSIS — R29.898 BILATERAL LEG WEAKNESS: Primary | ICD-10-CM

## 2021-02-25 LAB
ANION GAP SERPL CALCULATED.3IONS-SCNC: 8 MMOL/L (ref 3–16)
BASOPHILS ABSOLUTE: 0 K/UL (ref 0–0.2)
BASOPHILS RELATIVE PERCENT: 0.5 %
BUN BLDV-MCNC: 13 MG/DL (ref 7–20)
CALCIUM SERPL-MCNC: 9.3 MG/DL (ref 8.3–10.6)
CHLORIDE BLD-SCNC: 106 MMOL/L (ref 99–110)
CO2: 27 MMOL/L (ref 21–32)
CREAT SERPL-MCNC: 0.9 MG/DL (ref 0.9–1.3)
EOSINOPHILS ABSOLUTE: 0.3 K/UL (ref 0–0.6)
EOSINOPHILS RELATIVE PERCENT: 5.9 %
GFR AFRICAN AMERICAN: >60
GFR NON-AFRICAN AMERICAN: >60
GLUCOSE BLD-MCNC: 100 MG/DL (ref 70–99)
GLUCOSE BLD-MCNC: 117 MG/DL (ref 70–99)
HCT VFR BLD CALC: 39.8 % (ref 40.5–52.5)
HEMOGLOBIN: 13 G/DL (ref 13.5–17.5)
LYMPHOCYTES ABSOLUTE: 1.5 K/UL (ref 1–5.1)
LYMPHOCYTES RELATIVE PERCENT: 26.4 %
MCH RBC QN AUTO: 29.6 PG (ref 26–34)
MCHC RBC AUTO-ENTMCNC: 32.7 G/DL (ref 31–36)
MCV RBC AUTO: 90.6 FL (ref 80–100)
MONOCYTES ABSOLUTE: 0.4 K/UL (ref 0–1.3)
MONOCYTES RELATIVE PERCENT: 7 %
NEUTROPHILS ABSOLUTE: 3.5 K/UL (ref 1.7–7.7)
NEUTROPHILS RELATIVE PERCENT: 60.2 %
PDW BLD-RTO: 15.6 % (ref 12.4–15.4)
PERFORMED ON: ABNORMAL
PLATELET # BLD: 128 K/UL (ref 135–450)
PMV BLD AUTO: 10.3 FL (ref 5–10.5)
POTASSIUM REFLEX MAGNESIUM: 3.9 MMOL/L (ref 3.5–5.1)
RBC # BLD: 4.39 M/UL (ref 4.2–5.9)
SODIUM BLD-SCNC: 141 MMOL/L (ref 136–145)
VALPROIC ACID LEVEL: 39.8 UG/ML (ref 50–100)
WBC # BLD: 5.9 K/UL (ref 4–11)

## 2021-02-25 PROCEDURE — 70450 CT HEAD/BRAIN W/O DYE: CPT

## 2021-02-25 PROCEDURE — 99283 EMERGENCY DEPT VISIT LOW MDM: CPT

## 2021-02-25 PROCEDURE — 99284 EMERGENCY DEPT VISIT MOD MDM: CPT

## 2021-02-25 PROCEDURE — 80048 BASIC METABOLIC PNL TOTAL CA: CPT

## 2021-02-25 PROCEDURE — 36415 COLL VENOUS BLD VENIPUNCTURE: CPT

## 2021-02-25 PROCEDURE — 85025 COMPLETE CBC W/AUTO DIFF WBC: CPT

## 2021-02-25 PROCEDURE — 80164 ASSAY DIPROPYLACETIC ACD TOT: CPT

## 2021-02-25 ASSESSMENT — PAIN DESCRIPTION - DESCRIPTORS: DESCRIPTORS: THROBBING

## 2021-02-25 ASSESSMENT — PAIN SCALES - GENERAL: PAINLEVEL_OUTOF10: 8

## 2021-02-26 VITALS
SYSTOLIC BLOOD PRESSURE: 115 MMHG | TEMPERATURE: 99 F | DIASTOLIC BLOOD PRESSURE: 75 MMHG | BODY MASS INDEX: 28.42 KG/M2 | HEIGHT: 71 IN | WEIGHT: 203 LBS | HEART RATE: 79 BPM | RESPIRATION RATE: 21 BRPM | OXYGEN SATURATION: 99 %

## 2021-02-26 NOTE — ED PROVIDER NOTES
ED Attending Attestation Note     Date of evaluation: 2/25/2021    This patient was seen by the advance practice provider. I have seen and examined the patient, agree with the workup, evaluation, management and diagnosis. The care plan has been discussed. My assessment reveals planes of tremors and lower extremity weakness has been going on for several weeks to months. Patient does have 4/5 strength of bilateral lower extremities on exam with fine touch sensation intact. Will check laboratory studies, imaging.       Oscar Ochoa MD  02/26/21 1378

## 2021-02-26 NOTE — ED PROVIDER NOTES
(CLOZARIL) 100 MG TABLET    Take 200 mg by mouth nightly    DIVALPROEX (DEPAKOTE) 500 MG DR TABLET    Take 500 mg by mouth 2 times daily     FAMOTIDINE (PEPCID) 20 MG TABLET    Take 20 mg by mouth daily     IBUPROFEN (ADVIL;MOTRIN) 800 MG TABLET    Take 800 mg by mouth every 8 hours as needed for Pain    MELATONIN 3 MG TABS TABLET    Take 3 mg by mouth nightly    METOPROLOL TARTRATE (LOPRESSOR) 25 MG TABLET    Take 0.5 tablets by mouth 2 times daily    MULTIPLE VITAMINS-MINERALS (THERAPEUTIC MULTIVITAMIN-MINERALS) TABLET    Take 1 tablet by mouth daily    OMEGA-3 FATTY ACIDS (FISH OIL) 1000 MG CAPS    Take 1,000 mg by mouth 2 times daily    SERTRALINE (ZOLOFT) 100 MG TABLET    Take 150 mg by mouth daily     VITAMIN B-12 (CYANOCOBALAMIN) 1000 MCG TABLET    Take 1,000 mcg by mouth daily    VITAMIN D (ERGOCALCIFEROL) 1.25 MG (95522 UT) CAPS CAPSULE    Take 50,000 Units by mouth once a week Saturdays       Allergies     He is allergic to other and penicillins. Physical Exam     INITIAL VITALS: BP: 133/85, Temp: 99 °F (37.2 °C), Pulse: 93, Resp: 16, SpO2: 97 %  Physical Exam  Constitutional:       General: He is not in acute distress. Appearance: Normal appearance. He is normal weight. He is not ill-appearing. HENT:      Head: Normocephalic and atraumatic. Neck:      Musculoskeletal: Normal range of motion and neck supple. Cardiovascular:      Rate and Rhythm: Normal rate and regular rhythm. Heart sounds: No murmur. No friction rub. No gallop. Pulmonary:      Effort: Pulmonary effort is normal. No respiratory distress. Breath sounds: Normal breath sounds. Abdominal:      General: There is no distension. Palpations: Abdomen is soft. Tenderness: There is no abdominal tenderness. Musculoskeletal:         General: No tenderness, deformity or signs of injury. Right lower leg: No edema. Left lower leg: No edema. Skin:     General: Skin is warm and dry.    Neurological: Mental Status: He is alert and oriented to person, place, and time. Mental status is at baseline. Cranial Nerves: No cranial nerve deficit. Sensory: No sensory deficit. Motor: Weakness present.       Coordination: Coordination normal.      Comments: 3/5 strength throughout bilateral lower extremities   Psychiatric:         Mood and Affect: Mood normal.         Behavior: Behavior normal.         Diagnostic Results     RADIOLOGY:  CT HEAD WO CONTRAST   Final Result      No acute hemorrhage          LABS:   Results for orders placed or performed during the hospital encounter of 02/25/21   CBC Auto Differential   Result Value Ref Range    WBC 5.9 4.0 - 11.0 K/uL    RBC 4.39 4.20 - 5.90 M/uL    Hemoglobin 13.0 (L) 13.5 - 17.5 g/dL    Hematocrit 39.8 (L) 40.5 - 52.5 %    MCV 90.6 80.0 - 100.0 fL    MCH 29.6 26.0 - 34.0 pg    MCHC 32.7 31.0 - 36.0 g/dL    RDW 15.6 (H) 12.4 - 15.4 %    Platelets 566 (L) 173 - 450 K/uL    MPV 10.3 5.0 - 10.5 fL    Neutrophils % 60.2 %    Lymphocytes % 26.4 %    Monocytes % 7.0 %    Eosinophils % 5.9 %    Basophils % 0.5 %    Neutrophils Absolute 3.5 1.7 - 7.7 K/uL    Lymphocytes Absolute 1.5 1.0 - 5.1 K/uL    Monocytes Absolute 0.4 0.0 - 1.3 K/uL    Eosinophils Absolute 0.3 0.0 - 0.6 K/uL    Basophils Absolute 0.0 0.0 - 0.2 K/uL   Basic Metabolic Panel w/ Reflex to MG   Result Value Ref Range    Sodium 141 136 - 145 mmol/L    Potassium reflex Magnesium 3.9 3.5 - 5.1 mmol/L    Chloride 106 99 - 110 mmol/L    CO2 27 21 - 32 mmol/L    Anion Gap 8 3 - 16    Glucose 117 (H) 70 - 99 mg/dL    BUN 13 7 - 20 mg/dL    CREATININE 0.9 0.9 - 1.3 mg/dL    GFR Non-African American >60 >60    GFR African American >60 >60    Calcium 9.3 8.3 - 10.6 mg/dL   Depakote Level   Result Value Ref Range    Valproic Acid Lvl 39.8 (L) 50.0 - 100.0 ug/mL   POCT Glucose   Result Value Ref Range    POC Glucose 100 (H) 70 - 99 mg/dl    Performed on ACCU-CHEK        RECENT VITALS:  BP: 116/78, Temp: 99 °F (37.2 °C), Pulse: 91, Resp: 23, SpO2: 98 %     Procedures     None    ED Course     Nursing Notes, Past Medical Hx,Past Surgical Hx, Social Hx, Allergies, and Family Hx were reviewed. The patient was given the following medications:  No orders of the defined types were placed in this encounter. CONSULTS:  None    MEDICAL DECISION MAKING / ASSESSMENT / PLAN     Winsome Magallon is a 64 y.o. male with past medical history of schizophrenia who presents to the emergency department from [facility] assisted living for complaints of bilateral lower extremity weakness. On exam, this is a well-appearing male in no acute distress. Vital signs are within normal limits. Heart and lung sounds are normal, abdomen is soft and nontender. The patient is alert and oriented, however is a difficult historian. He does have 3 out of 5 strength of bilateral lower extremities diffusely with no sensory deficits. CT head with no acute intracranial abnormalities. Laboratory evaluation including CBC, BMP are within normal limits. VPA level is subtherapeutic. At this time, I do not have concern for acute spinal infection or compression as the etiology of his symptoms. I have low suspicion of acute cerebrovascular accident. Patient is likely suffering from chronic neurologic condition that warrants further neurology workup, however patient is at his baseline ambulation, using a wheelchair at his assisted living facility, therefore I feel that he is stable for outpatient work-up. Neurology referral is sent for the patient. The patient was encouraged to follow-up with primary care provider as needed or if symptoms continue, and to return to the emergency department with any new or worsening symptoms. Patient verbalizes understanding, is amenable with this plan, and at this time is stable for discharge. Patient remained hemodynamically stable throughout his stay in the emergency department.       This patient was also evaluated by

## 2021-02-26 NOTE — ED TRIAGE NOTES
Patient to ED via squad [facility] facility for tremors in his legs. States he is \"having trouble standing to use the urinal\". Thinks his legs gave out. Also complaining of neck and back pain. Patient a&ox4. States he was seen at [facility] for the tremors and he was supposed to follow up for the leg tremors with  neurology but states \"they did not call me back\".